# Patient Record
Sex: FEMALE | Race: WHITE | NOT HISPANIC OR LATINO | URBAN - METROPOLITAN AREA
[De-identification: names, ages, dates, MRNs, and addresses within clinical notes are randomized per-mention and may not be internally consistent; named-entity substitution may affect disease eponyms.]

---

## 2017-12-29 ENCOUNTER — EMERGENCY (EMERGENCY)
Facility: HOSPITAL | Age: 82
LOS: 1 days | Discharge: HOME | End: 2017-12-29
Attending: EMERGENCY MEDICINE | Admitting: EMERGENCY MEDICINE
Payer: COMMERCIAL

## 2017-12-29 VITALS
RESPIRATION RATE: 18 BRPM | DIASTOLIC BLOOD PRESSURE: 82 MMHG | HEART RATE: 80 BPM | SYSTOLIC BLOOD PRESSURE: 175 MMHG | OXYGEN SATURATION: 97 % | TEMPERATURE: 97 F

## 2017-12-29 VITALS
OXYGEN SATURATION: 97 % | WEIGHT: 218.92 LBS | SYSTOLIC BLOOD PRESSURE: 170 MMHG | TEMPERATURE: 98 F | HEART RATE: 80 BPM | RESPIRATION RATE: 20 BRPM | DIASTOLIC BLOOD PRESSURE: 83 MMHG | HEIGHT: 60 IN

## 2017-12-29 DIAGNOSIS — Z98.89 OTHER SPECIFIED POSTPROCEDURAL STATES: Chronic | ICD-10-CM

## 2017-12-29 LAB
ALBUMIN SERPL ELPH-MCNC: 4.3 G/DL — SIGNIFICANT CHANGE UP (ref 3.3–5.2)
ALP SERPL-CCNC: 62 U/L — SIGNIFICANT CHANGE UP (ref 40–120)
ALT FLD-CCNC: 22 U/L — SIGNIFICANT CHANGE UP
ANION GAP SERPL CALC-SCNC: 14 MMOL/L — SIGNIFICANT CHANGE UP (ref 5–17)
APTT BLD: 32.3 SEC — SIGNIFICANT CHANGE UP (ref 27.5–37.4)
AST SERPL-CCNC: 21 U/L — SIGNIFICANT CHANGE UP
BASOPHILS # BLD AUTO: 0 K/UL — SIGNIFICANT CHANGE UP (ref 0–0.2)
BASOPHILS NFR BLD AUTO: 0.3 % — SIGNIFICANT CHANGE UP (ref 0–2)
BILIRUB SERPL-MCNC: 0.3 MG/DL — LOW (ref 0.4–2)
BUN SERPL-MCNC: 25 MG/DL — HIGH (ref 8–20)
CALCIUM SERPL-MCNC: 10.1 MG/DL — SIGNIFICANT CHANGE UP (ref 8.6–10.2)
CHLORIDE SERPL-SCNC: 98 MMOL/L — SIGNIFICANT CHANGE UP (ref 98–107)
CO2 SERPL-SCNC: 27 MMOL/L — SIGNIFICANT CHANGE UP (ref 22–29)
CREAT SERPL-MCNC: 0.81 MG/DL — SIGNIFICANT CHANGE UP (ref 0.5–1.3)
EOSINOPHIL # BLD AUTO: 0.2 K/UL — SIGNIFICANT CHANGE UP (ref 0–0.5)
EOSINOPHIL NFR BLD AUTO: 1.7 % — SIGNIFICANT CHANGE UP (ref 0–6)
GLUCOSE SERPL-MCNC: 104 MG/DL — SIGNIFICANT CHANGE UP (ref 70–115)
HCT VFR BLD CALC: 38.2 % — SIGNIFICANT CHANGE UP (ref 37–47)
HGB BLD-MCNC: 12.4 G/DL — SIGNIFICANT CHANGE UP (ref 12–16)
INR BLD: 1.04 RATIO — SIGNIFICANT CHANGE UP (ref 0.88–1.16)
LYMPHOCYTES # BLD AUTO: 1.8 K/UL — SIGNIFICANT CHANGE UP (ref 1–4.8)
LYMPHOCYTES # BLD AUTO: 20.7 % — SIGNIFICANT CHANGE UP (ref 20–55)
MCHC RBC-ENTMCNC: 29.5 PG — SIGNIFICANT CHANGE UP (ref 27–31)
MCHC RBC-ENTMCNC: 32.5 G/DL — SIGNIFICANT CHANGE UP (ref 32–36)
MCV RBC AUTO: 91 FL — SIGNIFICANT CHANGE UP (ref 81–99)
MONOCYTES # BLD AUTO: 0.7 K/UL — SIGNIFICANT CHANGE UP (ref 0–0.8)
MONOCYTES NFR BLD AUTO: 7.7 % — SIGNIFICANT CHANGE UP (ref 3–10)
NEUTROPHILS # BLD AUTO: 6.2 K/UL — SIGNIFICANT CHANGE UP (ref 1.8–8)
NEUTROPHILS NFR BLD AUTO: 69.5 % — SIGNIFICANT CHANGE UP (ref 37–73)
PLATELET # BLD AUTO: 429 K/UL — HIGH (ref 150–400)
POTASSIUM SERPL-MCNC: 4.6 MMOL/L — SIGNIFICANT CHANGE UP (ref 3.5–5.3)
POTASSIUM SERPL-SCNC: 4.6 MMOL/L — SIGNIFICANT CHANGE UP (ref 3.5–5.3)
PROT SERPL-MCNC: 7.9 G/DL — SIGNIFICANT CHANGE UP (ref 6.6–8.7)
PROTHROM AB SERPL-ACNC: 11.4 SEC — SIGNIFICANT CHANGE UP (ref 9.8–12.7)
RBC # BLD: 4.2 M/UL — LOW (ref 4.4–5.2)
RBC # FLD: 12.7 % — SIGNIFICANT CHANGE UP (ref 11–15.6)
SODIUM SERPL-SCNC: 139 MMOL/L — SIGNIFICANT CHANGE UP (ref 135–145)
TROPONIN T SERPL-MCNC: <0.01 NG/ML — SIGNIFICANT CHANGE UP (ref 0–0.06)
WBC # BLD: 8.9 K/UL — SIGNIFICANT CHANGE UP (ref 4.8–10.8)
WBC # FLD AUTO: 8.9 K/UL — SIGNIFICANT CHANGE UP (ref 4.8–10.8)

## 2017-12-29 PROCEDURE — 85730 THROMBOPLASTIN TIME PARTIAL: CPT

## 2017-12-29 PROCEDURE — 96374 THER/PROPH/DIAG INJ IV PUSH: CPT

## 2017-12-29 PROCEDURE — 85610 PROTHROMBIN TIME: CPT

## 2017-12-29 PROCEDURE — 84484 ASSAY OF TROPONIN QUANT: CPT

## 2017-12-29 PROCEDURE — 93010 ELECTROCARDIOGRAM REPORT: CPT

## 2017-12-29 PROCEDURE — 93005 ELECTROCARDIOGRAM TRACING: CPT

## 2017-12-29 PROCEDURE — 99285 EMERGENCY DEPT VISIT HI MDM: CPT

## 2017-12-29 PROCEDURE — 71046 X-RAY EXAM CHEST 2 VIEWS: CPT

## 2017-12-29 PROCEDURE — 70450 CT HEAD/BRAIN W/O DYE: CPT | Mod: 26

## 2017-12-29 PROCEDURE — 99284 EMERGENCY DEPT VISIT MOD MDM: CPT

## 2017-12-29 PROCEDURE — 70450 CT HEAD/BRAIN W/O DYE: CPT

## 2017-12-29 PROCEDURE — 80053 COMPREHEN METABOLIC PANEL: CPT

## 2017-12-29 PROCEDURE — 71020: CPT | Mod: 26

## 2017-12-29 PROCEDURE — 85027 COMPLETE CBC AUTOMATED: CPT

## 2017-12-29 PROCEDURE — 36415 COLL VENOUS BLD VENIPUNCTURE: CPT

## 2017-12-29 RX ORDER — HYDRALAZINE HCL 50 MG
100 TABLET ORAL ONCE
Qty: 0 | Refills: 0 | Status: COMPLETED | OUTPATIENT
Start: 2017-12-29 | End: 2017-12-29

## 2017-12-29 RX ORDER — HYDRALAZINE HCL 50 MG
1 TABLET ORAL
Qty: 42 | Refills: 0
Start: 2017-12-29 | End: 2018-01-11

## 2017-12-29 RX ORDER — LABETALOL HCL 100 MG
10 TABLET ORAL ONCE
Qty: 0 | Refills: 0 | Status: COMPLETED | OUTPATIENT
Start: 2017-12-29 | End: 2017-12-29

## 2017-12-29 RX ADMIN — Medication 10 MILLIGRAM(S): at 16:02

## 2017-12-29 RX ADMIN — Medication 100 MILLIGRAM(S): at 19:17

## 2017-12-29 NOTE — ED STATDOCS - OBJECTIVE STATEMENT
81 y/o F pt with a hx of HDL, GERD, HTN, Afib presents to the ED with c/o generalized weakness. Pt went to see PMD this morning after checking her bp which was 230/120 around 10:00 am this morning. She took her 5 different medications for HTN this morning. Pt was also experiencing palpitations (1 episode)  this morning.  Denies CP, SOB, fevers, chills, numbness, tingling. No further complaints at this time.

## 2017-12-29 NOTE — ED ADULT NURSE NOTE - OBJECTIVE STATEMENT
pt alert and awake x3, arrived to ED with HTN crisis from PMD, PMD administered 50mg of metoprolol and 100mg of hydralazine, denies chest pain/sob, LS clear, denies n/v/d, states she feels weak.

## 2017-12-29 NOTE — ED PROVIDER NOTE - MEDICAL DECISION MAKING DETAILS
normal ct, ecg, and labs, clinically well, without symptoms in ED; OK for d/c, will add hydralazine to BP regimen

## 2017-12-29 NOTE — ED PROVIDER NOTE - OBJECTIVE STATEMENT
83 yo female hx of HTN sent in from PMD's office for hypertensive urgency; patient had a few episodes yesterday of feeling warm and flush and elevated blodd pressure measurements of 200 systolic on home BP machine; went to PMD office today c/o mild headache, ntoed  in office; given PO metoprolol and hydralazine in office and sent to ED; patient is without chest pain or SOB, headache in ED is essentially resolved.

## 2017-12-29 NOTE — ED ADULT NURSE NOTE - PMH
Cardiac rhythm disturbance    Diverticulitis    GERD (gastroesophageal reflux disease)    Hiatal hernia    HLD (hyperlipidemia)    HTN (hypertension)

## 2017-12-29 NOTE — ED STATDOCS - PROGRESS NOTE DETAILS
81 y/o F pt with a hx of HDL, GERD, HTN, Afib presents to the ED with c/o generalized weakness. Pt went to see PMD this morning after checking her bp which was 230/120 around 10:00 am this morning. She took her 5 different medications for HTN this morning. Pt was also experiencing palpitations (1 episode)  this morning.  Denies CP, SOB, fevers, chills, numbness, tingling. Pt will be sent to the Main for further eval.

## 2017-12-31 ENCOUNTER — EMERGENCY (EMERGENCY)
Facility: HOSPITAL | Age: 82
LOS: 1 days | Discharge: HOME | End: 2017-12-31
Attending: EMERGENCY MEDICINE
Payer: COMMERCIAL

## 2017-12-31 VITALS
HEART RATE: 59 BPM | OXYGEN SATURATION: 97 % | DIASTOLIC BLOOD PRESSURE: 59 MMHG | TEMPERATURE: 97 F | WEIGHT: 218.92 LBS | HEIGHT: 60 IN | RESPIRATION RATE: 18 BRPM | SYSTOLIC BLOOD PRESSURE: 129 MMHG

## 2017-12-31 VITALS
TEMPERATURE: 98 F | HEART RATE: 71 BPM | SYSTOLIC BLOOD PRESSURE: 145 MMHG | OXYGEN SATURATION: 100 % | DIASTOLIC BLOOD PRESSURE: 77 MMHG | RESPIRATION RATE: 17 BRPM

## 2017-12-31 DIAGNOSIS — Z98.89 OTHER SPECIFIED POSTPROCEDURAL STATES: Chronic | ICD-10-CM

## 2017-12-31 LAB
ALBUMIN SERPL ELPH-MCNC: 3.4 G/DL — SIGNIFICANT CHANGE UP (ref 3.3–5.2)
ALP SERPL-CCNC: 48 U/L — SIGNIFICANT CHANGE UP (ref 40–120)
ALT FLD-CCNC: 20 U/L — SIGNIFICANT CHANGE UP
ANION GAP SERPL CALC-SCNC: 13 MMOL/L — SIGNIFICANT CHANGE UP (ref 5–17)
ANION GAP SERPL CALC-SCNC: 14 MMOL/L — SIGNIFICANT CHANGE UP (ref 5–17)
APPEARANCE UR: CLEAR — SIGNIFICANT CHANGE UP
AST SERPL-CCNC: 21 U/L — SIGNIFICANT CHANGE UP
BACTERIA # UR AUTO: ABNORMAL
BASOPHILS # BLD AUTO: 0 K/UL — SIGNIFICANT CHANGE UP (ref 0–0.2)
BASOPHILS NFR BLD AUTO: 0.3 % — SIGNIFICANT CHANGE UP (ref 0–2)
BILIRUB SERPL-MCNC: 0.4 MG/DL — SIGNIFICANT CHANGE UP (ref 0.4–2)
BILIRUB UR-MCNC: NEGATIVE — SIGNIFICANT CHANGE UP
BUN SERPL-MCNC: 37 MG/DL — HIGH (ref 8–20)
BUN SERPL-MCNC: 41 MG/DL — HIGH (ref 8–20)
CALCIUM SERPL-MCNC: 9.2 MG/DL — SIGNIFICANT CHANGE UP (ref 8.6–10.2)
CALCIUM SERPL-MCNC: 9.6 MG/DL — SIGNIFICANT CHANGE UP (ref 8.6–10.2)
CHLORIDE SERPL-SCNC: 92 MMOL/L — LOW (ref 98–107)
CHLORIDE SERPL-SCNC: 99 MMOL/L — SIGNIFICANT CHANGE UP (ref 98–107)
CO2 SERPL-SCNC: 22 MMOL/L — SIGNIFICANT CHANGE UP (ref 22–29)
CO2 SERPL-SCNC: 22 MMOL/L — SIGNIFICANT CHANGE UP (ref 22–29)
COLOR SPEC: YELLOW — SIGNIFICANT CHANGE UP
CREAT SERPL-MCNC: 1.08 MG/DL — SIGNIFICANT CHANGE UP (ref 0.5–1.3)
CREAT SERPL-MCNC: 1.18 MG/DL — SIGNIFICANT CHANGE UP (ref 0.5–1.3)
DIFF PNL FLD: NEGATIVE — SIGNIFICANT CHANGE UP
EOSINOPHIL # BLD AUTO: 0.2 K/UL — SIGNIFICANT CHANGE UP (ref 0–0.5)
EOSINOPHIL NFR BLD AUTO: 3.1 % — SIGNIFICANT CHANGE UP (ref 0–6)
EPI CELLS # UR: SIGNIFICANT CHANGE UP
GLUCOSE SERPL-MCNC: 102 MG/DL — SIGNIFICANT CHANGE UP (ref 70–115)
GLUCOSE SERPL-MCNC: 113 MG/DL — SIGNIFICANT CHANGE UP (ref 70–115)
GLUCOSE UR QL: NEGATIVE MG/DL — SIGNIFICANT CHANGE UP
HCT VFR BLD CALC: 35.3 % — LOW (ref 37–47)
HGB BLD-MCNC: 11.5 G/DL — LOW (ref 12–16)
KETONES UR-MCNC: NEGATIVE — SIGNIFICANT CHANGE UP
LEUKOCYTE ESTERASE UR-ACNC: ABNORMAL
LYMPHOCYTES # BLD AUTO: 1.6 K/UL — SIGNIFICANT CHANGE UP (ref 1–4.8)
LYMPHOCYTES # BLD AUTO: 20.1 % — SIGNIFICANT CHANGE UP (ref 20–55)
MCHC RBC-ENTMCNC: 29.5 PG — SIGNIFICANT CHANGE UP (ref 27–31)
MCHC RBC-ENTMCNC: 32.6 G/DL — SIGNIFICANT CHANGE UP (ref 32–36)
MCV RBC AUTO: 90.5 FL — SIGNIFICANT CHANGE UP (ref 81–99)
MONOCYTES # BLD AUTO: 0.6 K/UL — SIGNIFICANT CHANGE UP (ref 0–0.8)
MONOCYTES NFR BLD AUTO: 8 % — SIGNIFICANT CHANGE UP (ref 3–10)
NEUTROPHILS # BLD AUTO: 5.3 K/UL — SIGNIFICANT CHANGE UP (ref 1.8–8)
NEUTROPHILS NFR BLD AUTO: 68.5 % — SIGNIFICANT CHANGE UP (ref 37–73)
NITRITE UR-MCNC: NEGATIVE — SIGNIFICANT CHANGE UP
OB PNL STL: NEGATIVE — SIGNIFICANT CHANGE UP
PH UR: 6 — SIGNIFICANT CHANGE UP (ref 5–8)
PLATELET # BLD AUTO: 383 K/UL — SIGNIFICANT CHANGE UP (ref 150–400)
POTASSIUM SERPL-MCNC: 4.4 MMOL/L — SIGNIFICANT CHANGE UP (ref 3.5–5.3)
POTASSIUM SERPL-MCNC: 4.9 MMOL/L — SIGNIFICANT CHANGE UP (ref 3.5–5.3)
POTASSIUM SERPL-SCNC: 4.4 MMOL/L — SIGNIFICANT CHANGE UP (ref 3.5–5.3)
POTASSIUM SERPL-SCNC: 4.9 MMOL/L — SIGNIFICANT CHANGE UP (ref 3.5–5.3)
PROT SERPL-MCNC: 7.2 G/DL — SIGNIFICANT CHANGE UP (ref 6.6–8.7)
PROT UR-MCNC: NEGATIVE MG/DL — SIGNIFICANT CHANGE UP
RBC # BLD: 3.9 M/UL — LOW (ref 4.4–5.2)
RBC # FLD: 12.9 % — SIGNIFICANT CHANGE UP (ref 11–15.6)
RBC CASTS # UR COMP ASSIST: SIGNIFICANT CHANGE UP /HPF (ref 0–4)
SODIUM SERPL-SCNC: 128 MMOL/L — LOW (ref 135–145)
SODIUM SERPL-SCNC: 134 MMOL/L — LOW (ref 135–145)
SP GR SPEC: 1.01 — SIGNIFICANT CHANGE UP (ref 1.01–1.02)
UROBILINOGEN FLD QL: NEGATIVE MG/DL — SIGNIFICANT CHANGE UP
WBC # BLD: 7.8 K/UL — SIGNIFICANT CHANGE UP (ref 4.8–10.8)
WBC # FLD AUTO: 7.8 K/UL — SIGNIFICANT CHANGE UP (ref 4.8–10.8)
WBC UR QL: SIGNIFICANT CHANGE UP

## 2017-12-31 PROCEDURE — 93005 ELECTROCARDIOGRAM TRACING: CPT

## 2017-12-31 PROCEDURE — 82272 OCCULT BLD FECES 1-3 TESTS: CPT

## 2017-12-31 PROCEDURE — 80048 BASIC METABOLIC PNL TOTAL CA: CPT

## 2017-12-31 PROCEDURE — 99284 EMERGENCY DEPT VISIT MOD MDM: CPT | Mod: 25

## 2017-12-31 PROCEDURE — 87086 URINE CULTURE/COLONY COUNT: CPT

## 2017-12-31 PROCEDURE — 36415 COLL VENOUS BLD VENIPUNCTURE: CPT

## 2017-12-31 PROCEDURE — 84484 ASSAY OF TROPONIN QUANT: CPT

## 2017-12-31 PROCEDURE — 99284 EMERGENCY DEPT VISIT MOD MDM: CPT

## 2017-12-31 PROCEDURE — 70450 CT HEAD/BRAIN W/O DYE: CPT

## 2017-12-31 PROCEDURE — 80053 COMPREHEN METABOLIC PANEL: CPT

## 2017-12-31 PROCEDURE — 81001 URINALYSIS AUTO W/SCOPE: CPT

## 2017-12-31 PROCEDURE — 85027 COMPLETE CBC AUTOMATED: CPT

## 2017-12-31 PROCEDURE — 70450 CT HEAD/BRAIN W/O DYE: CPT | Mod: 26

## 2017-12-31 PROCEDURE — 93010 ELECTROCARDIOGRAM REPORT: CPT

## 2017-12-31 RX ORDER — SODIUM CHLORIDE 9 MG/ML
500 INJECTION INTRAMUSCULAR; INTRAVENOUS; SUBCUTANEOUS ONCE
Qty: 0 | Refills: 0 | Status: COMPLETED | OUTPATIENT
Start: 2017-12-31 | End: 2017-12-31

## 2017-12-31 RX ORDER — SODIUM CHLORIDE 9 MG/ML
1000 INJECTION INTRAMUSCULAR; INTRAVENOUS; SUBCUTANEOUS ONCE
Qty: 0 | Refills: 0 | Status: COMPLETED | OUTPATIENT
Start: 2017-12-31 | End: 2017-12-31

## 2017-12-31 RX ADMIN — SODIUM CHLORIDE 500 MILLILITER(S): 9 INJECTION INTRAMUSCULAR; INTRAVENOUS; SUBCUTANEOUS at 14:01

## 2017-12-31 RX ADMIN — SODIUM CHLORIDE 1000 MILLILITER(S): 9 INJECTION INTRAMUSCULAR; INTRAVENOUS; SUBCUTANEOUS at 15:53

## 2017-12-31 RX ADMIN — Medication 1 TABLET(S): at 20:25

## 2017-12-31 NOTE — ED ADULT TRIAGE NOTE - CHIEF COMPLAINT QUOTE
BIBA, patient reports she has high blood pressure and recent medication change. Patient reports general malaise, fatigue, and headache.

## 2017-12-31 NOTE — ED PROVIDER NOTE - MEDICAL DECISION MAKING DETAILS
Pt here for 2nd visit w/ hypertension - now improved; Pt reports mild headache; but no CP/ SOB.  Plan to check CTh/ troponin; hydrate, reeval. Pt offered abx for L sinusitis on CT

## 2017-12-31 NOTE — ED ADULT NURSE REASSESSMENT NOTE - NS ED NURSE REASSESS COMMENT FT1
Pt to be discharged as per md spencer. Rpt Na 134, after receiving x2 boluses. Pt comfortable with DC and waiting for Paper work

## 2017-12-31 NOTE — ED ADULT NURSE NOTE - OBJECTIVE STATEMENT
Pt axox3 c/o  htn.  Pt states  her bp this morning was 200/95.  Pt recently treated and released from [facility] for htn and HCTZ was added to her medication regimen, which she states she has been complaint with. Pt states this AM she took an additional 0.2mg of clonidine, bp in Er now 118/64. Pt denies any sob or chest pain.

## 2017-12-31 NOTE — ED ADULT NURSE REASSESSMENT NOTE - COMFORT CARE
assisted to bathroom/plan of care explained/po fluids offered/meal provided/treatment delay explained/warm blanket provided

## 2017-12-31 NOTE — ED ADULT NURSE NOTE - CHPI ED SYMPTOMS NEG
no chest pain/no cough/no shortness of breath/no vomiting/no dizziness/no fever/no back pain/no diaphoresis/no nausea/no syncope/no chills

## 2017-12-31 NOTE — ED PROVIDER NOTE - OBJECTIVE STATEMENT
82F with h/o Afib on ASA, HTN, presents for evaluation of HTN after taking her BP this morning and finding it to be 200/95. Pt was here yesterday for HTN and given HCTZ  and discharged.  Pt reports she has been feeling generally fatigued recently and states that she feels some pressure behind the eyes, but denies any headache, chest pain/ SOB/ BALLESTEROS/ numbness/tingling, dizziness.  States she took an extra clonidine this morning after finding her pressure high.    Cardiologist: Dr. Pearl  PMD: Dr. Muller

## 2018-01-01 LAB
CULTURE RESULTS: NO GROWTH — SIGNIFICANT CHANGE UP
SPECIMEN SOURCE: SIGNIFICANT CHANGE UP

## 2018-01-04 ENCOUNTER — EMERGENCY (EMERGENCY)
Facility: HOSPITAL | Age: 83
LOS: 1 days | Discharge: HOME | End: 2018-01-04
Attending: EMERGENCY MEDICINE | Admitting: EMERGENCY MEDICINE
Payer: COMMERCIAL

## 2018-01-04 VITALS
OXYGEN SATURATION: 95 % | HEART RATE: 72 BPM | SYSTOLIC BLOOD PRESSURE: 165 MMHG | RESPIRATION RATE: 16 BRPM | DIASTOLIC BLOOD PRESSURE: 72 MMHG

## 2018-01-04 VITALS
HEIGHT: 60 IN | DIASTOLIC BLOOD PRESSURE: 77 MMHG | SYSTOLIC BLOOD PRESSURE: 169 MMHG | RESPIRATION RATE: 18 BRPM | HEART RATE: 82 BPM | TEMPERATURE: 98 F | OXYGEN SATURATION: 94 % | WEIGHT: 218.92 LBS

## 2018-01-04 DIAGNOSIS — Z98.89 OTHER SPECIFIED POSTPROCEDURAL STATES: Chronic | ICD-10-CM

## 2018-01-04 LAB
ALBUMIN SERPL ELPH-MCNC: 4.1 G/DL — SIGNIFICANT CHANGE UP (ref 3.3–5.2)
ALP SERPL-CCNC: 56 U/L — SIGNIFICANT CHANGE UP (ref 40–120)
ALT FLD-CCNC: 21 U/L — SIGNIFICANT CHANGE UP
ANION GAP SERPL CALC-SCNC: 13 MMOL/L — SIGNIFICANT CHANGE UP (ref 5–17)
AST SERPL-CCNC: 19 U/L — SIGNIFICANT CHANGE UP
BILIRUB SERPL-MCNC: 0.4 MG/DL — SIGNIFICANT CHANGE UP (ref 0.4–2)
BUN SERPL-MCNC: 32 MG/DL — HIGH (ref 8–20)
CALCIUM SERPL-MCNC: 10.2 MG/DL — SIGNIFICANT CHANGE UP (ref 8.6–10.2)
CHLORIDE SERPL-SCNC: 95 MMOL/L — LOW (ref 98–107)
CO2 SERPL-SCNC: 25 MMOL/L — SIGNIFICANT CHANGE UP (ref 22–29)
CREAT SERPL-MCNC: 0.87 MG/DL — SIGNIFICANT CHANGE UP (ref 0.5–1.3)
GLUCOSE SERPL-MCNC: 120 MG/DL — HIGH (ref 70–115)
HCT VFR BLD CALC: 37.9 % — SIGNIFICANT CHANGE UP (ref 37–47)
HGB BLD-MCNC: 12.7 G/DL — SIGNIFICANT CHANGE UP (ref 12–16)
MCHC RBC-ENTMCNC: 30.5 PG — SIGNIFICANT CHANGE UP (ref 27–31)
MCHC RBC-ENTMCNC: 33.5 G/DL — SIGNIFICANT CHANGE UP (ref 32–36)
MCV RBC AUTO: 90.9 FL — SIGNIFICANT CHANGE UP (ref 81–99)
NT-PROBNP SERPL-SCNC: 100 PG/ML — SIGNIFICANT CHANGE UP (ref 0–300)
PLATELET # BLD AUTO: 423 K/UL — HIGH (ref 150–400)
POTASSIUM SERPL-MCNC: 4.6 MMOL/L — SIGNIFICANT CHANGE UP (ref 3.5–5.3)
POTASSIUM SERPL-SCNC: 4.6 MMOL/L — SIGNIFICANT CHANGE UP (ref 3.5–5.3)
PROT SERPL-MCNC: 7.8 G/DL — SIGNIFICANT CHANGE UP (ref 6.6–8.7)
RBC # BLD: 4.17 M/UL — LOW (ref 4.4–5.2)
RBC # FLD: 13 % — SIGNIFICANT CHANGE UP (ref 11–15.6)
SODIUM SERPL-SCNC: 133 MMOL/L — LOW (ref 135–145)
TROPONIN T SERPL-MCNC: <0.01 NG/ML — SIGNIFICANT CHANGE UP (ref 0–0.06)
TSH SERPL-MCNC: 1.4 UIU/ML — SIGNIFICANT CHANGE UP (ref 0.27–4.2)
WBC # BLD: 7.8 K/UL — SIGNIFICANT CHANGE UP (ref 4.8–10.8)
WBC # FLD AUTO: 7.8 K/UL — SIGNIFICANT CHANGE UP (ref 4.8–10.8)

## 2018-01-04 PROCEDURE — 71045 X-RAY EXAM CHEST 1 VIEW: CPT

## 2018-01-04 PROCEDURE — 71045 X-RAY EXAM CHEST 1 VIEW: CPT | Mod: 26

## 2018-01-04 PROCEDURE — 99284 EMERGENCY DEPT VISIT MOD MDM: CPT | Mod: 25

## 2018-01-04 PROCEDURE — 99285 EMERGENCY DEPT VISIT HI MDM: CPT | Mod: 25

## 2018-01-04 PROCEDURE — 83880 ASSAY OF NATRIURETIC PEPTIDE: CPT

## 2018-01-04 PROCEDURE — 84443 ASSAY THYROID STIM HORMONE: CPT

## 2018-01-04 PROCEDURE — 84484 ASSAY OF TROPONIN QUANT: CPT

## 2018-01-04 PROCEDURE — 93010 ELECTROCARDIOGRAM REPORT: CPT

## 2018-01-04 PROCEDURE — 85027 COMPLETE CBC AUTOMATED: CPT

## 2018-01-04 PROCEDURE — 36415 COLL VENOUS BLD VENIPUNCTURE: CPT

## 2018-01-04 PROCEDURE — 93005 ELECTROCARDIOGRAM TRACING: CPT

## 2018-01-04 PROCEDURE — 80053 COMPREHEN METABOLIC PANEL: CPT

## 2018-01-04 RX ORDER — SODIUM CHLORIDE 9 MG/ML
1000 INJECTION INTRAMUSCULAR; INTRAVENOUS; SUBCUTANEOUS ONCE
Qty: 0 | Refills: 0 | Status: COMPLETED | OUTPATIENT
Start: 2018-01-04 | End: 2018-01-04

## 2018-01-04 RX ORDER — FLECAINIDE ACETATE 50 MG
50 TABLET ORAL ONCE
Qty: 0 | Refills: 0 | Status: COMPLETED | OUTPATIENT
Start: 2018-01-04 | End: 2018-01-04

## 2018-01-04 RX ORDER — NIFEDIPINE 30 MG
30 TABLET, EXTENDED RELEASE 24 HR ORAL ONCE
Qty: 0 | Refills: 0 | Status: COMPLETED | OUTPATIENT
Start: 2018-01-04 | End: 2018-01-04

## 2018-01-04 RX ORDER — METOPROLOL TARTRATE 50 MG
100 TABLET ORAL DAILY
Qty: 0 | Refills: 0 | Status: DISCONTINUED | OUTPATIENT
Start: 2018-01-04 | End: 2018-01-08

## 2018-01-04 RX ADMIN — Medication 0.2 MILLIGRAM(S): at 12:06

## 2018-01-04 RX ADMIN — Medication 30 MILLIGRAM(S): at 12:06

## 2018-01-04 RX ADMIN — SODIUM CHLORIDE 1000 MILLILITER(S): 9 INJECTION INTRAMUSCULAR; INTRAVENOUS; SUBCUTANEOUS at 10:26

## 2018-01-04 RX ADMIN — Medication 100 MILLIGRAM(S): at 12:06

## 2018-01-04 RX ADMIN — Medication 50 MILLIGRAM(S): at 12:06

## 2018-01-04 NOTE — ED ADULT NURSE REASSESSMENT NOTE - NS ED NURSE REASSESS COMMENT FT1
Patient reports she has not taken her daily BP meds and is currently hypertensive. PO medications to be given. Awaiting discharge.

## 2018-01-04 NOTE — ED PROVIDER NOTE - CARE PLAN
Principal Discharge DX:	HTN (hypertension)  Secondary Diagnosis:	Fatigue Principal Discharge DX:	HTN (hypertension)  Secondary Diagnosis:	Fatigue  Secondary Diagnosis:	Dehydration

## 2018-01-04 NOTE — ED ADULT NURSE REASSESSMENT NOTE - NS ED NURSE REASSESS COMMENT FT1
Patient to receive 2 L NS bolus for elevated BUN. No acute respiratory distress noted. Fluid bolus cleared by cardiology Yadira. discharge s/p fluids.

## 2018-01-04 NOTE — ED PROVIDER NOTE - OBJECTIVE STATEMENT
38 y/o female with a h/o asthma maintained on inhalers says she was wel until yesterday 83 y/o female with a h/o htn and afib states she has been very fatigued and she was here twice in the past few days and she was given Augmentin for sinusitis and also hydralazine 100tid was added to her bp meds and this am she got up and took her bp meds and she then took her bp a few minutes later and it was 194/97 and so she came to ed for eval no cp or sob she had a small amt of diarrhea yesterday no fever or chills and very mild nonproductive cough eating well no other symptoms 81 y/o female with a h/o htn and afib states she has been very fatigued and she was here twice in the past few days and she was given Augmentin for sinusitis and also hydralazine 100tid was added to her bp meds and this am she got up and took her bp meds and she then took her bp a few minutes later and it was 194/97 and so she came to ed for eval no cp or sob she had a small amt of diarrhea yesterday no fever or chills and very mild nonproductive cough eating well no other symptoms pt says she only drinks a cup of coffee in the morning and not much fluid during the day

## 2018-01-04 NOTE — ED PROVIDER NOTE - CHPI ED SYMPTOMS NEG
no diaphoresis/no dizziness/no chills/no nausea/no back pain/no chest pain/no syncope/no shortness of breath/no vomiting

## 2018-01-04 NOTE — ED PROVIDER NOTE - PROGRESS NOTE DETAILS
Cardiology Dr May [facility] in ed and states would rec d/c to f/u in their office and increase nifedipine to 60 mg daily no other tx until f/u in their office bun elevated as it has been the last 2 visits and this is prbably due to coffee and little else for fluid intake and contributing to pts fatigue as well as uri and diarrhea will hydrate and I discussed with pt the importance of good fluid intake as she has no h/o chf and dr Pulido says she has nl lv function pt understands and will f/u with them

## 2018-01-04 NOTE — ED ADULT NURSE NOTE - OBJECTIVE STATEMENT
Patient reports chills, general weakness. denies CP, SOB. Reports grandson was recently sick. Patient reports recent visit to ER for same issue. On Abx for sinus infection.

## 2019-12-11 ENCOUNTER — IMPORTED ENCOUNTER (OUTPATIENT)
Dept: URBAN - METROPOLITAN AREA CLINIC 50 | Facility: CLINIC | Age: 84
End: 2019-12-11

## 2020-01-16 ENCOUNTER — IMPORTED ENCOUNTER (OUTPATIENT)
Dept: URBAN - METROPOLITAN AREA CLINIC 50 | Facility: CLINIC | Age: 85
End: 2020-01-16

## 2020-08-13 ENCOUNTER — IMPORTED ENCOUNTER (OUTPATIENT)
Dept: URBAN - METROPOLITAN AREA CLINIC 50 | Facility: CLINIC | Age: 85
End: 2020-08-13

## 2021-01-28 ENCOUNTER — INPATIENT (INPATIENT)
Facility: HOSPITAL | Age: 86
LOS: 6 days | Discharge: HOME | DRG: 176 | End: 2021-02-04
Admitting: INTERNAL MEDICINE
Payer: MEDICARE

## 2021-01-28 VITALS
HEIGHT: 60 IN | RESPIRATION RATE: 18 BRPM | HEART RATE: 82 BPM | DIASTOLIC BLOOD PRESSURE: 80 MMHG | WEIGHT: 139.99 LBS | TEMPERATURE: 99 F | OXYGEN SATURATION: 94 % | SYSTOLIC BLOOD PRESSURE: 130 MMHG

## 2021-01-28 DIAGNOSIS — Z98.89 OTHER SPECIFIED POSTPROCEDURAL STATES: Chronic | ICD-10-CM

## 2021-01-28 PROCEDURE — 99285 EMERGENCY DEPT VISIT HI MDM: CPT

## 2021-01-28 NOTE — ED PROVIDER NOTE - CLINICAL SUMMARY MEDICAL DECISION MAKING FREE TEXT BOX
84 y/o F with a significant PMHx of GERD, HTN, HLD, hiatal hernia, diverticulitis, cholecystectomy, and cardiac rhythm disturbance presents to the ED c/o left sided abdominal pain onset a couple of days ago. 86 y/o F with a significant PMHx of GERD, HTN, HLD, hiatal hernia, diverticulitis, cholecystectomy, and cardiac rhythm disturbance presents to the ED c/o left sided abdominal pain onset a couple of days ago. Pt hypoxic here requiring supplemental oxygen in the setting of pna, abx started, pt given 2 grams of mag hospitalist requesting an additional 2g at this time, will admit for further management

## 2021-01-28 NOTE — ED ADULT TRIAGE NOTE - CHIEF COMPLAINT QUOTE
C/o LLQ x3 days. Pt states, "I have not had a bowel movement in a couple of days". Denies nausea, vomiting, diarrhea, cp, sob. Denies use of OTC medication for relief. Hx of HTN.

## 2021-01-28 NOTE — ED PROVIDER NOTE - OBJECTIVE STATEMENT
84 y/o F with a significant PMHx of GERD, HTN, HLD, hiatal hernia, diverticulitis, cholecystectomy, and cardiac rhythm disturbance presents to the ED c/o left sided abdominal pain onset a couple of days ago. Pt states the pain has been constant and non radiating since it started. Pt states that her last BM was a few days ago- she states that she generally has a BM daily. Pt is unsure if she has had a colonoscopy. Pt uses a walker to mobilize- pt lives with her . Pt is a nonsmoker and does not consume excessive EtOH. Pt has not taken any OTC pain medications. Pt denies any newfound rashes. NKDA. Pt denies any kidney troubles. Pt denies fevers/chills, ha, loc, focal neuro deficits, cp/sob/palp, cough, n/v/d, urinary symptoms, recent travel and sick contacts.

## 2021-01-29 ENCOUNTER — TRANSCRIPTION ENCOUNTER (OUTPATIENT)
Age: 86
End: 2021-01-29

## 2021-01-29 DIAGNOSIS — J18.9 PNEUMONIA, UNSPECIFIED ORGANISM: ICD-10-CM

## 2021-01-29 LAB
ALBUMIN SERPL ELPH-MCNC: 3.2 G/DL — LOW (ref 3.3–5.2)
ALP SERPL-CCNC: 54 U/L — SIGNIFICANT CHANGE UP (ref 40–120)
ALT FLD-CCNC: 46 U/L — HIGH
ANION GAP SERPL CALC-SCNC: 13 MMOL/L — SIGNIFICANT CHANGE UP (ref 5–17)
ANION GAP SERPL CALC-SCNC: 14 MMOL/L — SIGNIFICANT CHANGE UP (ref 5–17)
APPEARANCE UR: ABNORMAL
AST SERPL-CCNC: 37 U/L — HIGH
BASOPHILS # BLD AUTO: 0.05 K/UL — SIGNIFICANT CHANGE UP (ref 0–0.2)
BASOPHILS NFR BLD AUTO: 0.4 % — SIGNIFICANT CHANGE UP (ref 0–2)
BILIRUB SERPL-MCNC: 0.7 MG/DL — SIGNIFICANT CHANGE UP (ref 0.4–2)
BILIRUB UR-MCNC: NEGATIVE — SIGNIFICANT CHANGE UP
BUN SERPL-MCNC: 30 MG/DL — HIGH (ref 8–20)
BUN SERPL-MCNC: 32 MG/DL — HIGH (ref 8–20)
CALCIUM SERPL-MCNC: 9.3 MG/DL — SIGNIFICANT CHANGE UP (ref 8.6–10.2)
CALCIUM SERPL-MCNC: 9.7 MG/DL — SIGNIFICANT CHANGE UP (ref 8.6–10.2)
CHLORIDE SERPL-SCNC: 94 MMOL/L — LOW (ref 98–107)
CHLORIDE SERPL-SCNC: 95 MMOL/L — LOW (ref 98–107)
CO2 SERPL-SCNC: 27 MMOL/L — SIGNIFICANT CHANGE UP (ref 22–29)
CO2 SERPL-SCNC: 28 MMOL/L — SIGNIFICANT CHANGE UP (ref 22–29)
COLOR SPEC: YELLOW — SIGNIFICANT CHANGE UP
CREAT SERPL-MCNC: 1.27 MG/DL — SIGNIFICANT CHANGE UP (ref 0.5–1.3)
CREAT SERPL-MCNC: 1.28 MG/DL — SIGNIFICANT CHANGE UP (ref 0.5–1.3)
CRP SERPL-MCNC: 16.14 MG/DL — HIGH (ref 0–0.4)
D DIMER BLD IA.RAPID-MCNC: 3499 NG/ML DDU — HIGH
DIFF PNL FLD: ABNORMAL
EOSINOPHIL # BLD AUTO: 0.14 K/UL — SIGNIFICANT CHANGE UP (ref 0–0.5)
EOSINOPHIL NFR BLD AUTO: 1.1 % — SIGNIFICANT CHANGE UP (ref 0–6)
EPI CELLS # UR: ABNORMAL
FERRITIN SERPL-MCNC: 631 NG/ML — HIGH (ref 15–150)
GLUCOSE SERPL-MCNC: 120 MG/DL — HIGH (ref 70–99)
GLUCOSE SERPL-MCNC: 130 MG/DL — HIGH (ref 70–99)
GLUCOSE UR QL: NEGATIVE MG/DL — SIGNIFICANT CHANGE UP
HCT VFR BLD CALC: 33.2 % — LOW (ref 34.5–45)
HCT VFR BLD CALC: 34 % — LOW (ref 34.5–45)
HGB BLD-MCNC: 10.8 G/DL — LOW (ref 11.5–15.5)
HGB BLD-MCNC: 10.9 G/DL — LOW (ref 11.5–15.5)
IMM GRANULOCYTES NFR BLD AUTO: 0.5 % — SIGNIFICANT CHANGE UP (ref 0–1.5)
KETONES UR-MCNC: NEGATIVE — SIGNIFICANT CHANGE UP
LEUKOCYTE ESTERASE UR-ACNC: ABNORMAL
LIDOCAIN IGE QN: 17 U/L — LOW (ref 22–51)
LYMPHOCYTES # BLD AUTO: 1.47 K/UL — SIGNIFICANT CHANGE UP (ref 1–3.3)
LYMPHOCYTES # BLD AUTO: 11.8 % — LOW (ref 13–44)
MAGNESIUM SERPL-MCNC: 1.3 MG/DL — LOW (ref 1.6–2.6)
MAGNESIUM SERPL-MCNC: 2.3 MG/DL — SIGNIFICANT CHANGE UP (ref 1.6–2.6)
MCHC RBC-ENTMCNC: 29.4 PG — SIGNIFICANT CHANGE UP (ref 27–34)
MCHC RBC-ENTMCNC: 29.8 PG — SIGNIFICANT CHANGE UP (ref 27–34)
MCHC RBC-ENTMCNC: 31.8 GM/DL — LOW (ref 32–36)
MCHC RBC-ENTMCNC: 32.8 GM/DL — SIGNIFICANT CHANGE UP (ref 32–36)
MCV RBC AUTO: 90.7 FL — SIGNIFICANT CHANGE UP (ref 80–100)
MCV RBC AUTO: 92.6 FL — SIGNIFICANT CHANGE UP (ref 80–100)
MONOCYTES # BLD AUTO: 1.4 K/UL — HIGH (ref 0–0.9)
MONOCYTES NFR BLD AUTO: 11.2 % — SIGNIFICANT CHANGE UP (ref 2–14)
NEUTROPHILS # BLD AUTO: 9.35 K/UL — HIGH (ref 1.8–7.4)
NEUTROPHILS NFR BLD AUTO: 75 % — SIGNIFICANT CHANGE UP (ref 43–77)
NITRITE UR-MCNC: NEGATIVE — SIGNIFICANT CHANGE UP
OB PNL STL: NEGATIVE — SIGNIFICANT CHANGE UP
PH UR: 5 — SIGNIFICANT CHANGE UP (ref 5–8)
PHOSPHATE SERPL-MCNC: 4.2 MG/DL — SIGNIFICANT CHANGE UP (ref 2.4–4.7)
PLATELET # BLD AUTO: 187 K/UL — SIGNIFICANT CHANGE UP (ref 150–400)
PLATELET # BLD AUTO: 213 K/UL — SIGNIFICANT CHANGE UP (ref 150–400)
POTASSIUM SERPL-MCNC: 3.5 MMOL/L — SIGNIFICANT CHANGE UP (ref 3.5–5.3)
POTASSIUM SERPL-MCNC: 3.6 MMOL/L — SIGNIFICANT CHANGE UP (ref 3.5–5.3)
POTASSIUM SERPL-SCNC: 3.5 MMOL/L — SIGNIFICANT CHANGE UP (ref 3.5–5.3)
POTASSIUM SERPL-SCNC: 3.6 MMOL/L — SIGNIFICANT CHANGE UP (ref 3.5–5.3)
PROCALCITONIN SERPL-MCNC: 0.16 NG/ML — HIGH (ref 0.02–0.1)
PROT SERPL-MCNC: 7.4 G/DL — SIGNIFICANT CHANGE UP (ref 6.6–8.7)
PROT UR-MCNC: NEGATIVE MG/DL — SIGNIFICANT CHANGE UP
RAPID RVP RESULT: SIGNIFICANT CHANGE UP
RBC # BLD: 3.66 M/UL — LOW (ref 3.8–5.2)
RBC # BLD: 3.67 M/UL — LOW (ref 3.8–5.2)
RBC # FLD: 14.3 % — SIGNIFICANT CHANGE UP (ref 10.3–14.5)
RBC # FLD: 14.4 % — SIGNIFICANT CHANGE UP (ref 10.3–14.5)
RBC CASTS # UR COMP ASSIST: SIGNIFICANT CHANGE UP /HPF (ref 0–4)
SARS-COV-2 IGG SERPL QL IA: NEGATIVE — SIGNIFICANT CHANGE UP
SARS-COV-2 IGM SERPL IA-ACNC: 0.07 INDEX — SIGNIFICANT CHANGE UP
SARS-COV-2 RNA SPEC QL NAA+PROBE: SIGNIFICANT CHANGE UP
SODIUM SERPL-SCNC: 135 MMOL/L — SIGNIFICANT CHANGE UP (ref 135–145)
SODIUM SERPL-SCNC: 136 MMOL/L — SIGNIFICANT CHANGE UP (ref 135–145)
SP GR SPEC: 1.02 — SIGNIFICANT CHANGE UP (ref 1.01–1.02)
UROBILINOGEN FLD QL: NEGATIVE MG/DL — SIGNIFICANT CHANGE UP
WBC # BLD: 12.23 K/UL — HIGH (ref 3.8–10.5)
WBC # BLD: 12.47 K/UL — HIGH (ref 3.8–10.5)
WBC # FLD AUTO: 12.23 K/UL — HIGH (ref 3.8–10.5)
WBC # FLD AUTO: 12.47 K/UL — HIGH (ref 3.8–10.5)
WBC UR QL: ABNORMAL

## 2021-01-29 PROCEDURE — 71275 CT ANGIOGRAPHY CHEST: CPT | Mod: 26

## 2021-01-29 PROCEDURE — 71045 X-RAY EXAM CHEST 1 VIEW: CPT | Mod: 26

## 2021-01-29 PROCEDURE — 74176 CT ABD & PELVIS W/O CONTRAST: CPT | Mod: 26

## 2021-01-29 PROCEDURE — 99222 1ST HOSP IP/OBS MODERATE 55: CPT

## 2021-01-29 PROCEDURE — 93010 ELECTROCARDIOGRAM REPORT: CPT | Mod: 76

## 2021-01-29 PROCEDURE — 93306 TTE W/DOPPLER COMPLETE: CPT | Mod: 26

## 2021-01-29 PROCEDURE — 99223 1ST HOSP IP/OBS HIGH 75: CPT

## 2021-01-29 PROCEDURE — 93970 EXTREMITY STUDY: CPT | Mod: 26

## 2021-01-29 RX ORDER — HYDRALAZINE HCL 50 MG
50 TABLET ORAL EVERY 8 HOURS
Refills: 0 | Status: DISCONTINUED | OUTPATIENT
Start: 2021-01-29 | End: 2021-02-04

## 2021-01-29 RX ORDER — MORPHINE SULFATE 50 MG/1
2 CAPSULE, EXTENDED RELEASE ORAL EVERY 4 HOURS
Refills: 0 | Status: DISCONTINUED | OUTPATIENT
Start: 2021-01-29 | End: 2021-01-31

## 2021-01-29 RX ORDER — AZITHROMYCIN 500 MG/1
500 TABLET, FILM COATED ORAL ONCE
Refills: 0 | Status: DISCONTINUED | OUTPATIENT
Start: 2021-01-29 | End: 2021-01-29

## 2021-01-29 RX ORDER — SODIUM CHLORIDE 9 MG/ML
1000 INJECTION, SOLUTION INTRAVENOUS
Refills: 0 | Status: COMPLETED | OUTPATIENT
Start: 2021-01-29 | End: 2021-01-29

## 2021-01-29 RX ORDER — ACETAMINOPHEN 500 MG
975 TABLET ORAL ONCE
Refills: 0 | Status: COMPLETED | OUTPATIENT
Start: 2021-01-29 | End: 2021-01-29

## 2021-01-29 RX ORDER — PIPERACILLIN AND TAZOBACTAM 4; .5 G/20ML; G/20ML
3.38 INJECTION, POWDER, LYOPHILIZED, FOR SOLUTION INTRAVENOUS EVERY 8 HOURS
Refills: 0 | Status: DISCONTINUED | OUTPATIENT
Start: 2021-01-29 | End: 2021-01-29

## 2021-01-29 RX ORDER — FUROSEMIDE 40 MG
1 TABLET ORAL
Qty: 0 | Refills: 0 | DISCHARGE

## 2021-01-29 RX ORDER — SENNA PLUS 8.6 MG/1
2 TABLET ORAL AT BEDTIME
Refills: 0 | Status: DISCONTINUED | OUTPATIENT
Start: 2021-01-29 | End: 2021-02-04

## 2021-01-29 RX ORDER — LOSARTAN POTASSIUM 100 MG/1
50 TABLET, FILM COATED ORAL DAILY
Refills: 0 | Status: DISCONTINUED | OUTPATIENT
Start: 2021-01-29 | End: 2021-02-04

## 2021-01-29 RX ORDER — SIMVASTATIN 20 MG/1
20 TABLET, FILM COATED ORAL AT BEDTIME
Refills: 0 | Status: DISCONTINUED | OUTPATIENT
Start: 2021-01-29 | End: 2021-02-04

## 2021-01-29 RX ORDER — METOPROLOL TARTRATE 50 MG
1 TABLET ORAL
Qty: 0 | Refills: 0 | DISCHARGE

## 2021-01-29 RX ORDER — MAGNESIUM SULFATE 500 MG/ML
2 VIAL (ML) INJECTION ONCE
Refills: 0 | Status: COMPLETED | OUTPATIENT
Start: 2021-01-29 | End: 2021-01-29

## 2021-01-29 RX ORDER — FENOFIBRIC ACID 105 MG/1
1 TABLET ORAL
Qty: 0 | Refills: 0 | DISCHARGE

## 2021-01-29 RX ORDER — OXYBUTYNIN CHLORIDE 5 MG
5 TABLET ORAL DAILY
Refills: 0 | Status: DISCONTINUED | OUTPATIENT
Start: 2021-01-29 | End: 2021-02-04

## 2021-01-29 RX ORDER — PANTOPRAZOLE SODIUM 20 MG/1
40 TABLET, DELAYED RELEASE ORAL
Refills: 0 | Status: DISCONTINUED | OUTPATIENT
Start: 2021-01-29 | End: 2021-02-04

## 2021-01-29 RX ORDER — ENOXAPARIN SODIUM 100 MG/ML
65 INJECTION SUBCUTANEOUS EVERY 12 HOURS
Refills: 0 | Status: DISCONTINUED | OUTPATIENT
Start: 2021-01-29 | End: 2021-01-31

## 2021-01-29 RX ORDER — FLECAINIDE ACETATE 50 MG
50 TABLET ORAL EVERY 12 HOURS
Refills: 0 | Status: DISCONTINUED | OUTPATIENT
Start: 2021-01-29 | End: 2021-02-04

## 2021-01-29 RX ORDER — METOPROLOL TARTRATE 50 MG
100 TABLET ORAL DAILY
Refills: 0 | Status: DISCONTINUED | OUTPATIENT
Start: 2021-01-29 | End: 2021-02-04

## 2021-01-29 RX ORDER — FLECAINIDE ACETATE 50 MG
1 TABLET ORAL
Qty: 0 | Refills: 0 | DISCHARGE

## 2021-01-29 RX ORDER — OMEPRAZOLE 10 MG/1
1 CAPSULE, DELAYED RELEASE ORAL
Qty: 0 | Refills: 0 | DISCHARGE

## 2021-01-29 RX ORDER — POLYETHYLENE GLYCOL 3350 17 G/17G
17 POWDER, FOR SOLUTION ORAL DAILY
Refills: 0 | Status: DISCONTINUED | OUTPATIENT
Start: 2021-01-29 | End: 2021-02-04

## 2021-01-29 RX ORDER — HYDRALAZINE HCL 50 MG
50 TABLET ORAL EVERY 12 HOURS
Refills: 0 | Status: DISCONTINUED | OUTPATIENT
Start: 2021-01-29 | End: 2021-01-29

## 2021-01-29 RX ORDER — PIPERACILLIN AND TAZOBACTAM 4; .5 G/20ML; G/20ML
3.38 INJECTION, POWDER, LYOPHILIZED, FOR SOLUTION INTRAVENOUS ONCE
Refills: 0 | Status: COMPLETED | OUTPATIENT
Start: 2021-01-29 | End: 2021-01-29

## 2021-01-29 RX ORDER — OXYBUTYNIN CHLORIDE 5 MG
1 TABLET ORAL
Qty: 0 | Refills: 0 | DISCHARGE

## 2021-01-29 RX ORDER — CEFTRIAXONE 500 MG/1
1000 INJECTION, POWDER, FOR SOLUTION INTRAMUSCULAR; INTRAVENOUS ONCE
Refills: 0 | Status: DISCONTINUED | OUTPATIENT
Start: 2021-01-29 | End: 2021-01-29

## 2021-01-29 RX ORDER — SIMVASTATIN 20 MG/1
1 TABLET, FILM COATED ORAL
Qty: 0 | Refills: 0 | DISCHARGE

## 2021-01-29 RX ORDER — ENOXAPARIN SODIUM 100 MG/ML
40 INJECTION SUBCUTANEOUS DAILY
Refills: 0 | Status: DISCONTINUED | OUTPATIENT
Start: 2021-01-29 | End: 2021-01-29

## 2021-01-29 RX ORDER — FUROSEMIDE 40 MG
40 TABLET ORAL DAILY
Refills: 0 | Status: DISCONTINUED | OUTPATIENT
Start: 2021-01-29 | End: 2021-02-04

## 2021-01-29 RX ADMIN — ENOXAPARIN SODIUM 65 MILLIGRAM(S): 100 INJECTION SUBCUTANEOUS at 17:03

## 2021-01-29 RX ADMIN — PIPERACILLIN AND TAZOBACTAM 200 GRAM(S): 4; .5 INJECTION, POWDER, LYOPHILIZED, FOR SOLUTION INTRAVENOUS at 05:00

## 2021-01-29 RX ADMIN — Medication 50 GRAM(S): at 03:04

## 2021-01-29 RX ADMIN — SODIUM CHLORIDE 100 MILLILITER(S): 9 INJECTION, SOLUTION INTRAVENOUS at 21:12

## 2021-01-29 RX ADMIN — Medication 5 MILLIGRAM(S): at 13:51

## 2021-01-29 RX ADMIN — Medication 50 MILLIGRAM(S): at 06:59

## 2021-01-29 RX ADMIN — POLYETHYLENE GLYCOL 3350 17 GRAM(S): 17 POWDER, FOR SOLUTION ORAL at 13:51

## 2021-01-29 RX ADMIN — Medication 0.2 MILLIGRAM(S): at 17:04

## 2021-01-29 RX ADMIN — Medication 100 MILLIGRAM(S): at 06:59

## 2021-01-29 RX ADMIN — Medication 50 MILLIGRAM(S): at 10:46

## 2021-01-29 RX ADMIN — Medication 50 MILLIGRAM(S): at 17:04

## 2021-01-29 RX ADMIN — Medication 975 MILLIGRAM(S): at 00:39

## 2021-01-29 RX ADMIN — MORPHINE SULFATE 2 MILLIGRAM(S): 50 CAPSULE, EXTENDED RELEASE ORAL at 07:31

## 2021-01-29 RX ADMIN — PANTOPRAZOLE SODIUM 40 MILLIGRAM(S): 20 TABLET, DELAYED RELEASE ORAL at 06:59

## 2021-01-29 RX ADMIN — Medication 2 GRAM(S): at 04:03

## 2021-01-29 RX ADMIN — SIMVASTATIN 20 MILLIGRAM(S): 20 TABLET, FILM COATED ORAL at 21:12

## 2021-01-29 RX ADMIN — LOSARTAN POTASSIUM 50 MILLIGRAM(S): 100 TABLET, FILM COATED ORAL at 13:51

## 2021-01-29 RX ADMIN — SODIUM CHLORIDE 100 MILLILITER(S): 9 INJECTION, SOLUTION INTRAVENOUS at 05:20

## 2021-01-29 RX ADMIN — ENOXAPARIN SODIUM 65 MILLIGRAM(S): 100 INJECTION SUBCUTANEOUS at 07:32

## 2021-01-29 RX ADMIN — Medication 2 GRAM(S): at 05:05

## 2021-01-29 RX ADMIN — Medication 40 MILLIGRAM(S): at 06:59

## 2021-01-29 RX ADMIN — SENNA PLUS 2 TABLET(S): 8.6 TABLET ORAL at 21:12

## 2021-01-29 RX ADMIN — Medication 50 MILLIGRAM(S): at 21:12

## 2021-01-29 RX ADMIN — MORPHINE SULFATE 2 MILLIGRAM(S): 50 CAPSULE, EXTENDED RELEASE ORAL at 23:20

## 2021-01-29 RX ADMIN — Medication 50 GRAM(S): at 04:03

## 2021-01-29 RX ADMIN — Medication 0.2 MILLIGRAM(S): at 06:59

## 2021-01-29 NOTE — H&P ADULT - NSHPPHYSICALEXAM_GEN_ALL_CORE
Vital Signs Last 24 Hrs  T(C): 36.4 (29 Jan 2021 02:34), Max: 37.1 (28 Jan 2021 23:27)  T(F): 97.6 (29 Jan 2021 02:34), Max: 98.8 (28 Jan 2021 23:27)  HR: 67 (29 Jan 2021 02:34) (67 - 82)  BP: 120/74 (29 Jan 2021 02:34) (120/74 - 130/80)  BP(mean): --  RR: 20 (29 Jan 2021 02:34) (18 - 20)  SpO2: 95% (29 Jan 2021 04:03) (89% - 95%)    GENERAL: NAD, appears stated age  HEENT:  Atraumatic, Normocephalic, MMM, no oropharyngeal lesions  EYES: EOMI, PERRLA, conjunctiva and sclera clear  NECK: Supple, No JVD, no throat tenderness.  CHEST/LUNG: shallow breaths, decreased at bases otherwise clear  HEART: Regular rate and rhythm; No murmurs, rubs, or gallops  ABDOMEN: obese, ventral/umbilical hernia with some tenderness, but easily reducible  EXTREMITIES:  2+ Peripheral Pulses, No clubbing, cyanosis, or edema  PSYCH: AAOx3, normal affect  NEUROLOGY: moves all extremities spontaneously. no sensory deficits  SKIN: No rashes or lesions

## 2021-01-29 NOTE — CONSULT NOTE ADULT - SUBJECTIVE AND OBJECTIVE BOX
[facility]  INFECTIOUS DISEASES AND INTERNAL MEDICINE at [place]  =======================================================  Malik Kim MD  Diplomates American Board of Internal Medicine and Infectious Diseases  Tel  [phone]  Fax [phone]  =======================================================    [id number]  DANIEL SHAW   HPI: This 85F hx GERD, HTN, HLD, Diverticulitis presents to ER not feeling well. She states last few days hasn't been feeling well. She states she's felt nausea, weak, with some mild abdominal pain. She states hasn't had a bm in a few days. Otherwise generally not feeling well, but can't specify symptoms. Pt is poor historian. She's had some subjective low grade fevers. Denies cough, shortness of breath chest pain, chill, difficulty swallowing, dysuria or vomiting. Denies sick contacts.  In ER, pt found to be hypoxic to 89 on room air. Pulse was 132 and pt afebrile. CT abdomen/pelvis was negative for abdominal pathology, showed cavitary lesion in left lower lung. Pt given zosyn.  (29 Jan 2021 05:24)  CT lungs Angio: Bilateral pulmonary emboli. No evidence for right heart strain.  Right lower lobe cavitation likely reflects cavitary pulmonary infarct. Differential includes pulmonary abscess and cavitary neoplasm. Follow-up is recommended to ensure resolution.  Additional unenhancing left lower lobe airspace opacities may represent additional infarcts versus concomitant pneumonia.      We are called to evaluate the patient for cavitary lesions.   She has no hx of tuberculosis.   patient denies incarceration, homelessness,   denies fevers, night sweats, weight loss.   no smoking hx  her  smoked for 20+ years around her.     She was started on  anticoagulation by the medical team      I have personally reviewed the labs and data; pertinent labs and data are listed in this note; please see below.   =======================================================  Past Medical & Surgical Hx:  =====================  PAST MEDICAL & SURGICAL HISTORY:  Cardiac rhythm disturbance    Diverticulitis    Hiatal hernia    HLD (hyperlipidemia)    GERD (gastroesophageal reflux disease)    HTN (hypertension)    History of cholecystectomy      Problem List:  ==========  HEALTH ISSUES - PROBLEM Dx:        Social Hx:  =======  no toxic habits currently    FAMILY HISTORY:  No pertinent family history in first degree relatives    no significant family history of immunosuppressive disorders in mother or father   =======================================================    REVIEW OF SYSTEMS:  CONSTITUTIONAL:  No Fever or chills  HEENT:  No diplopia or blurred vision.  No earache, sore throat or runny nose.  CARDIOVASCULAR:  No pressure, squeezing, strangling, tightness, heaviness or aching about the chest, neck, axilla or epigastrium.  RESPIRATORY:  No cough, shortness of breath  GASTROINTESTINAL:  No nausea, vomiting or diarrhea.  GENITOURINARY:  No dysuria, frequency or urgency. No Blood in urine  MUSCULOSKELETAL:  no joint aches, no muscle pain  SKIN:  No change in skin, hair or nails.  NEUROLOGIC:  No Headaches, seizures or weakness.  PSYCHIATRIC:  No disorder of thought or mood.  ENDOCRINE:  No heat or cold intolerance  HEMATOLOGICAL:  No easy bruising or bleeding.    =======================================================  Allergies  sulfa drugs (Rash)    Other medications:  cloNIDine 0.2 milliGRAM(s) Oral two times a day  enoxaparin Injectable 65 milliGRAM(s) SubCutaneous every 12 hours  flecainide 50 milliGRAM(s) Oral every 12 hours  furosemide    Tablet 40 milliGRAM(s) Oral daily  hydrALAZINE 50 milliGRAM(s) Oral every 8 hours  lactated ringers. 1000 milliLiter(s) IV Continuous <Continuous>  losartan 50 milliGRAM(s) Oral daily  metoprolol succinate  milliGRAM(s) Oral daily  oxybutynin 5 milliGRAM(s) Oral daily  pantoprazole    Tablet 40 milliGRAM(s) Oral before breakfast  polyethylene glycol 3350 17 Gram(s) Oral daily  senna 2 Tablet(s) Oral at bedtime  simvastatin 20 milliGRAM(s) Oral at bedtime     piperacillin/tazobactam IVPB...   200 mL/Hr IV Intermittent (01-29-21 @ 05:00)      ======================================================  Physical Exam:  ============  T(F): 98.7 (29 Jan 2021 07:39), Max: 98.8 (28 Jan 2021 23:27)  HR: 73 (29 Jan 2021 10:45)  BP: 138/65 (29 Jan 2021 10:45)  RR: 20 (29 Jan 2021 10:45)  SpO2: 94% (29 Jan 2021 10:45) (89% - 96%)  temp max in last 48H T(F): , Max: 98.8 (01-28-21 @ 23:27)Height (cm): 152.4 (01-28-21 @ 23:27)  Weight (kg): 63.5 (01-28-21 @ 23:27)  BMI (kg/m2): 27.3 (01-28-21 @ 23:27)  BSA (m2): 1.6 (01-28-21 @ 23:27)    General:  No acute distress.  Eye: Pupils are equal, round and reactive to light, Extraocular movements are intact, Normal conjunctiva.  HENT: Normocephalic, Oral mucosa is moist, No pharyngeal erythema, No sinus tenderness.  Neck: Supple, No lymphadenopathy.  Respiratory: Lungs are clear to auscultation, Respirations are non-labored.  Cardiovascular: Normal rate, Regular rhythm,   Gastrointestinal: Soft, Non-tender, Non-distended, Normal bowel sounds.  Genitourinary: No costovertebral angle tenderness.  Lymphatics: No lymphadenopathy neck,   Musculoskeletal: Normal range of motion, Normal strength.  Integumentary: No rash.  Neurologic: Alert, Oriented, No focal deficits, Cranial Nerves II-XII are grossly intact.  Psychiatric: Appropriate mood & affect.    =======================================================  Labs:                        10.8   12.23 )-----------( 213      ( 29 Jan 2021 09:25 )             34.0     01-29    135  |  94<L>  |  30.0<H>  ----------------------------<  120<H>  3.6   |  28.0  |  1.28    Ca    9.7      29 Jan 2021 04:03  Phos  4.2     01-29  Mg     2.3     01-29    TPro  7.4  /  Alb  3.2<L>  /  TBili  0.7  /  DBili  x   /  AST  37<H>  /  ALT  46<H>  /  AlkPhos  54  01-29      Creatinine, Serum: 1.28 mg/dL (01-29-21 @ 04:03)  Creatinine, Serum: 1.27 mg/dL (01-29-21 @ 00:52)    C-Reactive Protein, Serum: 16.14 mg/dL (01-29-21 @ 04:03)      Procalcitonin, Serum: 0.16 ng/mL (01-29-21 @ 04:03)    SARS-CoV-2: NotDetec (01-29-21 @ 04:04)  Rapid RVP Result: NotDetec (01-29-21 @ 04:04)      < from: CT Angio Chest w/ IV Cont (01.29.21 @ 06:43) >     EXAM:  CT ANGIO CHEST (W)AW IC                          PROCEDURE DATE:  01/29/2021          INTERPRETATION:  CLINICAL INFORMATION: Elevated d-dimer. Abdominal CT demonstrated a cavitary lesion in the right lower lobe.    COMPARISON: CT chest 3/15/2011    PROCEDURE:  CT Angiography of the Chest.  55 ml of Visipaque 320 was injected intravenously. 45 ml were discarded.  Sagittal and coronal reformats were performed as well as 3D (MIP) reconstructions.    FINDINGS:    LUNGS AND AIRWAYS: Patent central airways.  Right lower lobe cavitation with surrounding unenhancing airspace opacity measures 3.3 x 2.0 cm. Unenhancing left lower lobe opacities. Subsegmental left lower lobe atelectasis.  PLEURA: Trace left pleural effusion.  MEDIASTINUM AND GAETANO: No lymphadenopathy.  VESSELS: Left lower lobe segmental through subsegmental pulmonary emboli. Right upper and lower lobar through subsegmental pulmonary emboli. Right middle lobar pulmonary embolus.  HEART: Heart size is normal. No pericardial effusion.  CHEST WALL AND LOWER NECK: Within normal limits.  VISUALIZED UPPER ABDOMEN: Within normal limits.  BONES: Within normal limits.    IMPRESSION:  Bilateral pulmonary emboli. No evidence for right heart strain.  Right lower lobe cavitation likely reflects cavitary pulmonary infarct. Differential includes pulmonary abscess and cavitary neoplasm. Follow-up is recommended to ensure resolution.  Additional unenhancing left lower lobe airspace opacities may represent additional infarcts versus concomitant pneumonia.  Findings were discussed with Dr. SYDNEE PANTOJA [phone] 1/29/2021 6:53 AM by Dr. Sena with read back confirmation.            TANNA SENA MD; Attending Radiologist  This document has been electronically signed. Jan29 2021  6:57AM    < end of copied text >  
PULMONARY CONSULT NOTE      CHAYO [id number]    Patient is a 85y old  Female who presents with a chief complaint of hypoxia, cavitary lung lesion (29 Jan 2021 05:24)      HISTORY OF PRESENT ILLNESS: Pt poor historian; hx dementia; hx from chart and daughter Isabel over phone. 85F hx GERD, HTN, HLD, Diverticulitis presents to ER not feeling well. She states last few days hasn't been feeling well. She states she's felt nausea, weak, with some mild abdominal pain. She states hasn't had a bm in a few days. Otherwise generally not feeling well, but can't specify symptoms. Pt is poor historian. She's had some subjective low grade fevers. Denies cough, shortness of breath chest pain, chill, difficulty swallowing, dysuria or vomiting. Denies sick contacts. Daughter says the patient's  called 911 b/c of her back pain, poor appetite the past few days.     In ER, pt found to be hypoxic to 89 on room air. Pulse was 132 and pt afebrile. CT abdomen/pelvis was negative for abdominal pathology, showed cavitary lesion in left lower lung. Pt given zosyn.     CTA done showed b/l PE.      MEDICATIONS  (STANDING):  cloNIDine 0.2 milliGRAM(s) Oral two times a day  enoxaparin Injectable 65 milliGRAM(s) SubCutaneous every 12 hours  flecainide 50 milliGRAM(s) Oral every 12 hours  furosemide    Tablet 40 milliGRAM(s) Oral daily  hydrALAZINE 50 milliGRAM(s) Oral every 8 hours  lactated ringers. 1000 milliLiter(s) (100 mL/Hr) IV Continuous <Continuous>  losartan 50 milliGRAM(s) Oral daily  metoprolol succinate  milliGRAM(s) Oral daily  oxybutynin 5 milliGRAM(s) Oral daily  pantoprazole    Tablet 40 milliGRAM(s) Oral before breakfast  polyethylene glycol 3350 17 Gram(s) Oral daily  senna 2 Tablet(s) Oral at bedtime  simvastatin 20 milliGRAM(s) Oral at bedtime      MEDICATIONS  (PRN):  morphine  - Injectable 2 milliGRAM(s) IV Push every 4 hours PRN Severe Pain (7 - 10)      Allergies    sulfa drugs (Rash)    Intolerances        PAST MEDICAL & SURGICAL HISTORY:  Cardiac rhythm disturbance    Diverticulitis    Hiatal hernia    HLD (hyperlipidemia)    GERD (gastroesophageal reflux disease)    HTN (hypertension)    History of cholecystectomy        FAMILY HISTORY:  No pertinent family history in first degree relatives        SOCIAL HISTORY  Smoking History: denies ever smoking    REVIEW OF SYSTEMS:    unable to get ROS    Vital Signs Last 24 Hrs  T(C): 37.1 (29 Jan 2021 07:39), Max: 37.1 (28 Jan 2021 23:27)  T(F): 98.7 (29 Jan 2021 07:39), Max: 98.8 (28 Jan 2021 23:27)  HR: 73 (29 Jan 2021 10:45) (67 - 82)  BP: 138/65 (29 Jan 2021 10:45) (120/74 - 189/83)  BP(mean): --  RR: 20 (29 Jan 2021 10:45) (18 - 24)  SpO2: 94% (29 Jan 2021 10:45) (89% - 96%)    PHYSICAL EXAMINATION:    GENERAL: The patient is  in no apparent distress.     HEENT: Head is normocephalic and atraumatic.  Mucous membranes are moist.     NECK: Supple.     LUNGS: rales b/l bases, respirations unlabored    HEART: Regular rate and rhythm     ABDOMEN: Soft, nontender, and nondistended.       EXTREMITIES: Without any cyanosis, clubbing, rash, lesions or edema.    NEUROLOGIC: Grossly intact.      LABS:                        10.8   12.23 )-----------( 213      ( 29 Jan 2021 09:25 )             34.0     01-29    135  |  94<L>  |  30.0<H>  ----------------------------<  120<H>  3.6   |  28.0  |  1.28    Ca    9.7      29 Jan 2021 04:03  Phos  4.2     01-29  Mg     2.3     01-29    TPro  7.4  /  Alb  3.2<L>  /  TBili  0.7  /  DBili  x   /  AST  37<H>  /  ALT  46<H>  /  AlkPhos  54  01-29             D-Dimer Assay, Quantitative: 3499 ng/mL DDU (01-29-21 @ 04:03)        Procalcitonin, Serum: 0.16 ng/mL (01-29-21 @ 04:03)      MICROBIOLOGY:  SARS-CoV-2: [facility]: This Respiratory Panel uses polymerase chain reaction (PCR) to detect for  adenovirus; coronavirus (HKU1, NL63, 229E, OC43); human metapneumovirus  (hMPV); human enterovirus/rhinovirus (Entero/RV); influenza A; influenza  A/H1; influenza A/H3; influenza A/H1-2009; influenza B; parainfluenza  viruses 1, 2, 3, 4; respiratory syncytial virus; Mycoplasma pneumoniae;  Chlamydophila pneumoniae; and SARS-CoV-2. (01.29.21 @ 04:04)        RADIOLOGY & ADDITIONAL STUDIES:  < from: CT Angio Chest w/ IV Cont (01.29.21 @ 06:43) >     EXAM:  CT ANGIO CHEST (W)AW IC                          PROCEDURE DATE:  01/29/2021          INTERPRETATION:  CLINICAL INFORMATION: Elevated d-dimer. Abdominal CT demonstrated a cavitary lesion in the right lower lobe.    COMPARISON: CT chest 3/15/2011    PROCEDURE:  CT Angiography of the Chest.  55 ml of Visipaque 320 was injected intravenously. 45 ml were discarded.  Sagittal and coronal reformats were performed as well as 3D (MIP) reconstructions.    FINDINGS:    LUNGS AND AIRWAYS: Patent central airways.  Right lower lobe cavitation with surrounding unenhancing airspace opacity measures 3.3 x 2.0 cm. Unenhancing left lower lobe opacities. Subsegmental left lower lobe atelectasis.  PLEURA: Trace left pleural effusion.  MEDIASTINUM AND GAETANO: No lymphadenopathy.  VESSELS: Left lower lobe segmental through subsegmental pulmonary emboli. Right upper and lower lobar through subsegmental pulmonary emboli. Right middle lobar pulmonary embolus.  HEART: Heart size is normal. No pericardial effusion.  CHEST WALL AND LOWER NECK: Within normal limits.  VISUALIZED UPPER ABDOMEN: Within normal limits.  BONES: Within normal limits.    IMPRESSION:  Bilateral pulmonary emboli. No evidence for right heart strain.    Right lower lobe cavitation likely reflects cavitary pulmonary infarct. Differential includes pulmonary abscess and cavitary neoplasm. Follow-up is recommended to ensure resolution.    Additional unenhancing left lower lobe airspace opacities may represent additional infarcts versus concomitant pneumonia.    Findings were discussed with Dr. SYDNEE PANTOJA [phone] 1/29/2021 6:53 AM by Dr. Sena with read back confirmation.            TANNA SENA MD; Attending Radiologist  This document has been electronically signed. Jan29 2021  6:57AM    < end of copied text >

## 2021-01-29 NOTE — DISCHARGE NOTE NURSING/CASE MANAGEMENT/SOCIAL WORK - NSDCFUADDAPPT_GEN_ALL_CORE_FT
STAR patient : Patient has a prescheduled appointment with Dr Kendell Aponte on Monday 2/8/ 2021 at 11:20 am  STAR patient : Patient has a prescheduled appointment with Dr Kendell Aponte on Monday 2/8/ 2021 at 11:20 am     PULM - Pt. has an appointment on  2/11 at 12:15 with Dr Yanelis Mayo, Address: [address] [place] [address] [address], [place] [address]  Phone: [phone]    Daughter will manage her medications . STAR patient : Patient has a prescheduled appointment with Dr Kendell Aponte on Monday 2/8/ 2021 at 11:20 am     PULM - Pt. has an appointment on  2/11 at 12:15 with Dr Yanelis Mayo, Address: [address] [place] [address] [address], [place] [address]  Phone: [phone]     STAR patient : Patient has a prescheduled appointment with Dr Kendell Aponte on Monday 2/8/ 2021 at 11:20 am     PULM - Pt. has an appointment on  2/11 at 12:15 with Dr Yanelis Mayo, Address: [address] [place] [address] [address], [place] [address]  Phone: [phone]    Daughter informed she will manage pt's medications ,no concerns reported.

## 2021-01-29 NOTE — CONSULT NOTE ADULT - ASSESSMENT
This 85F hx GERD, HTN, HLD, Diverticulitis presents to ER not feeling well. She states last few days hasn't been feeling well. She states she's felt nausea, weak, with some mild abdominal pain. She states hasn't had a bm in a few days. Otherwise generally not feeling well, but can't specify symptoms. Pt is poor historian. She's had some subjective low grade fevers. Denies cough, shortness of breath chest pain, chill, difficulty swallowing, dysuria or vomiting. Denies sick contacts.  In ER, pt found to be hypoxic to 89 on room air. Pulse was 132 and pt afebrile. CT abdomen/pelvis was negative for abdominal pathology, showed cavitary lesion in left lower lung. Pt given zosyn.  (29 Jan 2021 05:24)  CT lungs Angio: Bilateral pulmonary emboli. No evidence for right heart strain.  Right lower lobe cavitation likely reflects cavitary pulmonary infarct. Differential includes pulmonary abscess and cavitary neoplasm. Follow-up is recommended to ensure resolution.  Additional unenhancing left lower lobe airspace opacities may represent additional infarcts versus concomitant pneumonia.      We are called to evaluate the patient for cavitary lesions.   She has no hx of tuberculosis.   patient denies incarceration, homelessness,   denies fevers, night sweats, weight loss.   no smoking hx  her  smoked for 20+ years around her.     Impression:  - Bilateral PE  - cavitary lung lesion  - hypoxia      Plan:  - for cavitary lung lesion  - no clear sx of tuberculosis  - significant hx of second hand smoking, ? lung neoplasm with necrosis  - check TB quantiferon  - no need for resp isolation    Bilateral PE/ Hypoxia  - continue oxygen and Anticoagulation per Medical team      No further specific infectious disease recommendations. Will be available as needed.    Will sign off.

## 2021-01-29 NOTE — DISCHARGE NOTE NURSING/CASE MANAGEMENT/SOCIAL WORK - PATIENT PORTAL LINK FT
You can access the FollowMyHealth Patient Portal offered by [facility] by registering at the following website: [email or web address]. By joining [facility]’s FollowMyHealth portal, you will also be able to view your health information using other applications (apps) compatible with our system.

## 2021-01-29 NOTE — ED ADULT NURSE NOTE - OBJECTIVE STATEMENT
Pt presents to ED c/o abdominal pain xtoday. Pt denies N/V/D and states it has been "a few days" since she had a BM. Pt states that she usually goes every day. Pt denies other complaints, is AAOx4 and in NAD.

## 2021-01-29 NOTE — CONSULT NOTE ADULT - ASSESSMENT
-PE  -Cavitary lung lesion; infectious etiology possible, no fever, no cough but mild WBC; could also be from infarct; malignancy  in DDx as well  -Leukocytosis    RECC:  Anticoagulation. ECHO. Would trt for pna; ID consult. She says she has no cough or sputum but if she does would do sputum culture.     D/W dtr Isabel.

## 2021-01-29 NOTE — CHART NOTE - NSCHARTNOTEFT_GEN_A_CORE
Received call from Radiologist regarding CTA. Patient found to have bilateral PE. No evidence of right heart strain. Initial cavitary lung lesion possibly just infarcted lung. Will start patient on full dose lovenox. Check TTE, LE dopplers, probnp and troponin. Patient made aware of diagnosis

## 2021-01-29 NOTE — H&P ADULT - ASSESSMENT
85F hx GERD, HTN, HLD, Diverticulitis presents to ER not feeling well/nonspecific symptoms found to be hypoxic with cavitary lung lesion.       #Hypoxia  -likely 2/2 pneumonia  -d-dimer greatly elevated, will rule out PE with CTA  -rule out covid, rvp sent  -currently saturating well on 2L nc  -continue to monitor on     #Pneumonia w/ cavitary lung lesion  -will further assess with CTA chest  -denies aspiration, recent travel, prior lung infections  -will treat with zosyn to cover anaerobes/atypicals  -pulm consult  -id consult    #HTN  -will c/w toprol, clonidine, and hydralazine with old parameters    #HLD  -c/w simvastatin    #Hypomagnesemia  -1.4, repleted in ER  -continue to monitor    #cardiac arrythmia  -pt on flecainide at home, chart states history of cardiac arrythmia  -patient states she takes flecainide, but doesn't recall diagnosis of afib or other cardiac arrythmia  -will continue for now, clarify on am    #DVT  ppx- lovenox 40 sq 85F hx GERD, HTN, HLD, Diverticulitis presents to ER not feeling well/nonspecific symptoms found to be hypoxic with cavitary lung lesion.       #Hypoxia  -likely 2/2 pneumonia  -d-dimer greatly elevated, will rule out PE with CTA  -rule out covid, rvp sent  -currently saturating well on 2L nc  -continue to monitor on     #Pneumonia w/ cavitary lung lesion  -will further assess with CTA chest  -denies aspiration, recent travel, prior lung infections  -will treat with zosyn to cover anaerobes/atypicals  -pulm consult  -id consult    #HTN  -will c/w toprol, clonidine, and hydralazine with old parameters    #HLD  -c/w simvastatin    #Constipation  -will start on bowel regimen  -monitor for bm's    #umbilical hernia  -slightly tender, but reducible  -continue to monitor with serial abdominal exams    #Hypomagnesemia  -1.4, repleted in ER  -continue to monitor    #cardiac arrythmia  -pt on flecainide at home, chart states history of cardiac arrythmia  -patient states she takes flecainide, but doesn't recall diagnosis of afib or other cardiac arrythmia  -will continue for now, clarify on am    #DVT  ppx- lovenox 40 sq

## 2021-01-29 NOTE — H&P ADULT - HISTORY OF PRESENT ILLNESS
85F hx GERD, HTN, HLD, Diverticulitis presents to ER not feeling well. She states last few days hasn't been feeling well. She states she's felt nausea, weak, with some mild abdominal pain. She states hasn't had a bm in a few days. Otherwise generally not feeling well, but can't specify symptoms. Pt is poor historian. She's had some subjective low grade fevers. Denies cough, shortness of breath chest pain, chill, difficulty swallowing, dysuria or vomiting. Denies sick contacts.    In ER, pt found to be hypoxic to 89 on room air. Pulse was 132 and pt afebrile. CT abdomen/pelvis was negative for abdominal pathology, showed cavitary lesion in left lower lung. Pt given zosyn.

## 2021-01-29 NOTE — H&P ADULT - NSICDXPASTMEDICALHX_GEN_ALL_CORE_FT
PAST MEDICAL HISTORY:  Cardiac rhythm disturbance     Diverticulitis     GERD (gastroesophageal reflux disease)     Hiatal hernia     HLD (hyperlipidemia)     HTN (hypertension)

## 2021-01-29 NOTE — H&P ADULT - NSHPLABSRESULTS_GEN_ALL_CORE
10.9   12.47 )-----------( 187      ( 29 Jan 2021 00:52 )             33.2   01-29    136  |  95<L>  |  32.0<H>  ----------------------------<  130<H>  3.5   |  27.0  |  1.27    Ca    9.3      29 Jan 2021 00:52  Mg     1.3     01-29    TPro  7.4  /  Alb  3.2<L>  /  TBili  0.7  /  DBili  x   /  AST  37<H>  /  ALT  46<H>  /  AlkPhos  54  01-29        Lactate Trend    CAPILLARY BLOOD GLUCOSE    RADIOLOGY, EKG & ADDITIONAL TESTS: Reviewed.     < from: CT Renal Stone Hunt (01.29.21 @ 02:04) >    FINDINGS:  LOWER CHEST: Peribronchial thickening adjacent infiltrate in the basilar segments of the left lower lobe. 4 x 2.5 x 4.6 cm thick walled, cavitary mass in the superior segment right lower lobe, inseparable from the major fissure, with adjacent pleural thickening. This is new from the prior. Coronary artery calcifications. Mitral valve calcifications. Trace left pleural effusion.    LIVER: Hepatic steatosis and mild hepatomegaly  BILE DUCTS: Unremarkable, unenhanced appearance  GALLBLADDER: Cholecystectomy.  SPLEEN: Unremarkable, unenhanced appearance  PANCREAS: Fatty infiltration  ADRENALS: 2.3 cm adenoma in the left adrenal gland  KIDNEYS/URETERS: Bilateral renal cysts, as well as other hyperdense lesions that are incompletely characterized by noncontrast CT. No hydronephrosis or hydroureter.    BLADDER: Unremarkable, unenhanced appearance  REPRODUCTIVE ORGANS: Leiomyomatous uterus.    BOWEL: No bowel obstruction. Appendix is normal. Diverticulosis coli.  PERITONEUM: No free airor free fluid  VESSELS: Normal caliber  RETROPERITONEUM/LYMPH NODES: No hemorrhage or enlarged lymph node measuring greater than 10 mm in short axis  ABDOMINAL WALL: Small bowel containing ventral hernia without obstruction.  BONES: Degenerative changes without acute osseous abnormality.    IMPRESSION:    1. Left lower lobe bronchopneumonia with small left pleural effusion.  2. Thick walled cavitary mass in the superior segment right lower lobe with adjacent pleural thickening. A CT chest with contrast is recommended to evaluate for malignancy.  3. Bilateral renal cysts as well as other hyperdense lesions are incompletely characterized on this noncontrast CT. Nonemergent/outpatient sonographic follow-up can be obtained if clinically warranted.    < end of copied text >

## 2021-01-30 DIAGNOSIS — I10 ESSENTIAL (PRIMARY) HYPERTENSION: ICD-10-CM

## 2021-01-30 DIAGNOSIS — E78.5 HYPERLIPIDEMIA, UNSPECIFIED: ICD-10-CM

## 2021-01-30 DIAGNOSIS — I26.99 OTHER PULMONARY EMBOLISM WITHOUT ACUTE COR PULMONALE: ICD-10-CM

## 2021-01-30 DIAGNOSIS — K21.9 GASTRO-ESOPHAGEAL REFLUX DISEASE WITHOUT ESOPHAGITIS: ICD-10-CM

## 2021-01-30 DIAGNOSIS — I49.9 CARDIAC ARRHYTHMIA, UNSPECIFIED: ICD-10-CM

## 2021-01-30 LAB
ANION GAP SERPL CALC-SCNC: 14 MMOL/L — SIGNIFICANT CHANGE UP (ref 5–17)
BUN SERPL-MCNC: 23 MG/DL — HIGH (ref 8–20)
CALCIUM SERPL-MCNC: 9.5 MG/DL — SIGNIFICANT CHANGE UP (ref 8.6–10.2)
CHLORIDE SERPL-SCNC: 95 MMOL/L — LOW (ref 98–107)
CO2 SERPL-SCNC: 29 MMOL/L — SIGNIFICANT CHANGE UP (ref 22–29)
CREAT SERPL-MCNC: 1.1 MG/DL — SIGNIFICANT CHANGE UP (ref 0.5–1.3)
GAMMA INTERFERON BACKGROUND BLD IA-ACNC: 0.03 IU/ML — SIGNIFICANT CHANGE UP
GLUCOSE SERPL-MCNC: 117 MG/DL — HIGH (ref 70–99)
HCT VFR BLD CALC: 34.4 % — LOW (ref 34.5–45)
HGB BLD-MCNC: 11 G/DL — LOW (ref 11.5–15.5)
LEGIONELLA AG UR QL: NEGATIVE — SIGNIFICANT CHANGE UP
M TB IFN-G BLD-IMP: NEGATIVE — SIGNIFICANT CHANGE UP
M TB IFN-G CD4+ BCKGRND COR BLD-ACNC: -0.01 IU/ML — SIGNIFICANT CHANGE UP
M TB IFN-G CD4+CD8+ BCKGRND COR BLD-ACNC: -0.01 IU/ML — SIGNIFICANT CHANGE UP
MCHC RBC-ENTMCNC: 29.6 PG — SIGNIFICANT CHANGE UP (ref 27–34)
MCHC RBC-ENTMCNC: 32 GM/DL — SIGNIFICANT CHANGE UP (ref 32–36)
MCV RBC AUTO: 92.5 FL — SIGNIFICANT CHANGE UP (ref 80–100)
PLATELET # BLD AUTO: 250 K/UL — SIGNIFICANT CHANGE UP (ref 150–400)
POTASSIUM SERPL-MCNC: 3.6 MMOL/L — SIGNIFICANT CHANGE UP (ref 3.5–5.3)
POTASSIUM SERPL-SCNC: 3.6 MMOL/L — SIGNIFICANT CHANGE UP (ref 3.5–5.3)
QUANT TB PLUS MITOGEN MINUS NIL: 3.53 IU/ML — SIGNIFICANT CHANGE UP
RBC # BLD: 3.72 M/UL — LOW (ref 3.8–5.2)
RBC # FLD: 14.1 % — SIGNIFICANT CHANGE UP (ref 10.3–14.5)
SODIUM SERPL-SCNC: 138 MMOL/L — SIGNIFICANT CHANGE UP (ref 135–145)
WBC # BLD: 13.01 K/UL — HIGH (ref 3.8–10.5)
WBC # FLD AUTO: 13.01 K/UL — HIGH (ref 3.8–10.5)

## 2021-01-30 PROCEDURE — 99232 SBSQ HOSP IP/OBS MODERATE 35: CPT

## 2021-01-30 PROCEDURE — 99233 SBSQ HOSP IP/OBS HIGH 50: CPT

## 2021-01-30 RX ADMIN — Medication 100 MILLIGRAM(S): at 05:22

## 2021-01-30 RX ADMIN — PANTOPRAZOLE SODIUM 40 MILLIGRAM(S): 20 TABLET, DELAYED RELEASE ORAL at 05:26

## 2021-01-30 RX ADMIN — Medication 50 MILLIGRAM(S): at 21:05

## 2021-01-30 RX ADMIN — SENNA PLUS 2 TABLET(S): 8.6 TABLET ORAL at 21:05

## 2021-01-30 RX ADMIN — ENOXAPARIN SODIUM 65 MILLIGRAM(S): 100 INJECTION SUBCUTANEOUS at 18:12

## 2021-01-30 RX ADMIN — ENOXAPARIN SODIUM 65 MILLIGRAM(S): 100 INJECTION SUBCUTANEOUS at 05:23

## 2021-01-30 RX ADMIN — Medication 0.2 MILLIGRAM(S): at 05:22

## 2021-01-30 RX ADMIN — Medication 5 MILLIGRAM(S): at 15:09

## 2021-01-30 RX ADMIN — Medication 40 MILLIGRAM(S): at 05:23

## 2021-01-30 RX ADMIN — Medication 50 MILLIGRAM(S): at 05:23

## 2021-01-30 RX ADMIN — Medication 50 MILLIGRAM(S): at 05:22

## 2021-01-30 RX ADMIN — LOSARTAN POTASSIUM 50 MILLIGRAM(S): 100 TABLET, FILM COATED ORAL at 05:23

## 2021-01-30 RX ADMIN — Medication 50 MILLIGRAM(S): at 18:12

## 2021-01-30 RX ADMIN — SIMVASTATIN 20 MILLIGRAM(S): 20 TABLET, FILM COATED ORAL at 21:05

## 2021-01-30 RX ADMIN — Medication 0.2 MILLIGRAM(S): at 18:12

## 2021-01-30 RX ADMIN — Medication 50 MILLIGRAM(S): at 12:01

## 2021-01-30 NOTE — PROGRESS NOTE ADULT - ASSESSMENT
-PE  -Cavitary lung lesion; infectious etiology possible, no fever, no cough but mild WBC; could also be from infarct; malignancy  in DDx as well  -Leukocytosis  -Dementia    RECC:  Anticoagulation.   Abx reccs per ID.  She says she has no cough or sputum but if she does would do sputum culture.       -PE  -Cavitary lung lesion; infectious etiology possible, no fever, no cough but mild WBC; could also be from infarct; malignancy  in DDx as well  -Leukocytosis  -Dementia    RECC:  Anticoagulation.   Abx reccs per ID.  She says she has no cough or sputum but if she does would do sputum culture. Repeat CT as outpt 2-3 months.

## 2021-01-30 NOTE — PROGRESS NOTE ADULT - PROBLEM SELECTOR PLAN 1
Also has bilateral DVT. Continue Lovenox, supplemental oxygen, will transition to oral AC. Monitor pulse ox.

## 2021-01-30 NOTE — PROGRESS NOTE ADULT - ATTENDING COMMENTS
Discussed with patient and RN. Fall at home per patient, will consult PT. Discussed with patient and RN. Fall at home per patient, will consult PT.  Updated daughter Isabel, [phone].

## 2021-01-30 NOTE — PROGRESS NOTE ADULT - ASSESSMENT
85 yr old female with hypertension, hyperlipidemia, dementia, GERD, diverticulitis presented with few days of not feeling well, cough, low grade fevers. She was noted to be hypoxic and tachycardic in ED. CT abdomen was done, did not reveal any abdominal pathology, showed a right lower lung cavitary lesion. CTA chest was done, showed bilateral pulmonary emboli with possible pulmonary infarct, no right heart strain. Full dose anticoagulation was initiated. ECHO and lower extremity dopplers were ordered. Pulmonary and ID consultations were requested. ID advised checking QuantiFeron for TB but no need for airborne isolation.

## 2021-01-31 DIAGNOSIS — R91.8 OTHER NONSPECIFIC ABNORMAL FINDING OF LUNG FIELD: ICD-10-CM

## 2021-01-31 LAB
ANION GAP SERPL CALC-SCNC: 10 MMOL/L — SIGNIFICANT CHANGE UP (ref 5–17)
BUN SERPL-MCNC: 24 MG/DL — HIGH (ref 8–20)
CALCIUM SERPL-MCNC: 9.7 MG/DL — SIGNIFICANT CHANGE UP (ref 8.6–10.2)
CHLORIDE SERPL-SCNC: 94 MMOL/L — LOW (ref 98–107)
CO2 SERPL-SCNC: 30 MMOL/L — HIGH (ref 22–29)
CREAT SERPL-MCNC: 0.96 MG/DL — SIGNIFICANT CHANGE UP (ref 0.5–1.3)
GLUCOSE SERPL-MCNC: 129 MG/DL — HIGH (ref 70–99)
HCT VFR BLD CALC: 33.1 % — LOW (ref 34.5–45)
HGB BLD-MCNC: 10.5 G/DL — LOW (ref 11.5–15.5)
MCHC RBC-ENTMCNC: 29.1 PG — SIGNIFICANT CHANGE UP (ref 27–34)
MCHC RBC-ENTMCNC: 31.7 GM/DL — LOW (ref 32–36)
MCV RBC AUTO: 91.7 FL — SIGNIFICANT CHANGE UP (ref 80–100)
PLATELET # BLD AUTO: 278 K/UL — SIGNIFICANT CHANGE UP (ref 150–400)
POTASSIUM SERPL-MCNC: 3.5 MMOL/L — SIGNIFICANT CHANGE UP (ref 3.5–5.3)
POTASSIUM SERPL-SCNC: 3.5 MMOL/L — SIGNIFICANT CHANGE UP (ref 3.5–5.3)
RBC # BLD: 3.61 M/UL — LOW (ref 3.8–5.2)
RBC # FLD: 13.8 % — SIGNIFICANT CHANGE UP (ref 10.3–14.5)
SODIUM SERPL-SCNC: 134 MMOL/L — LOW (ref 135–145)
WBC # BLD: 12.75 K/UL — HIGH (ref 3.8–10.5)
WBC # FLD AUTO: 12.75 K/UL — HIGH (ref 3.8–10.5)

## 2021-01-31 PROCEDURE — 99233 SBSQ HOSP IP/OBS HIGH 50: CPT

## 2021-01-31 RX ORDER — APIXABAN 2.5 MG/1
10 TABLET, FILM COATED ORAL EVERY 12 HOURS
Refills: 0 | Status: DISCONTINUED | OUTPATIENT
Start: 2021-01-31 | End: 2021-02-04

## 2021-01-31 RX ADMIN — APIXABAN 10 MILLIGRAM(S): 2.5 TABLET, FILM COATED ORAL at 16:48

## 2021-01-31 RX ADMIN — Medication 100 MILLIGRAM(S): at 05:29

## 2021-01-31 RX ADMIN — Medication 50 MILLIGRAM(S): at 16:48

## 2021-01-31 RX ADMIN — Medication 50 MILLIGRAM(S): at 14:19

## 2021-01-31 RX ADMIN — Medication 50 MILLIGRAM(S): at 21:49

## 2021-01-31 RX ADMIN — SIMVASTATIN 20 MILLIGRAM(S): 20 TABLET, FILM COATED ORAL at 21:49

## 2021-01-31 RX ADMIN — ENOXAPARIN SODIUM 65 MILLIGRAM(S): 100 INJECTION SUBCUTANEOUS at 05:32

## 2021-01-31 RX ADMIN — Medication 40 MILLIGRAM(S): at 05:29

## 2021-01-31 RX ADMIN — PANTOPRAZOLE SODIUM 40 MILLIGRAM(S): 20 TABLET, DELAYED RELEASE ORAL at 05:30

## 2021-01-31 RX ADMIN — Medication 0.2 MILLIGRAM(S): at 16:48

## 2021-01-31 RX ADMIN — Medication 50 MILLIGRAM(S): at 05:30

## 2021-01-31 RX ADMIN — Medication 5 MILLIGRAM(S): at 08:30

## 2021-01-31 RX ADMIN — LOSARTAN POTASSIUM 50 MILLIGRAM(S): 100 TABLET, FILM COATED ORAL at 05:29

## 2021-01-31 RX ADMIN — Medication 0.2 MILLIGRAM(S): at 05:31

## 2021-01-31 NOTE — PROGRESS NOTE ADULT - ATTENDING COMMENTS
Discussed with patient and RN.  Anticipate discharge in 24-48 hrs. Discussed with patient and RN.  Anticipate discharge in 24-48 hrs.  Updated daughter Isabel.

## 2021-01-31 NOTE — PROGRESS NOTE ADULT - ASSESSMENT
85 yr old female with hypertension, hyperlipidemia, dementia, GERD, diverticulitis presented with few days of not feeling well, cough, low grade fevers. She was noted to be hypoxic and tachycardic in ED. CT abdomen was done, did not reveal any abdominal pathology, showed a right lower lung cavitary lesion. CTA chest was done, showed bilateral pulmonary emboli with possible pulmonary infarct, no right heart strain. Full dose anticoagulation was initiated. ECHO and lower extremity dopplers were ordered. Pulmonary and ID consultations were requested. ID advised checking QuantiFeron for TB but no need for airborne isolation. She was transitioned to [facility], PT was consulted.

## 2021-02-01 ENCOUNTER — TRANSCRIPTION ENCOUNTER (OUTPATIENT)
Age: 86
End: 2021-02-01

## 2021-02-01 PROCEDURE — 99233 SBSQ HOSP IP/OBS HIGH 50: CPT

## 2021-02-01 PROCEDURE — 99232 SBSQ HOSP IP/OBS MODERATE 35: CPT

## 2021-02-01 RX ORDER — APIXABAN 2.5 MG/1
2 TABLET, FILM COATED ORAL
Qty: 150 | Refills: 0
Start: 2021-02-01 | End: 2021-02-07

## 2021-02-01 RX ADMIN — Medication 0.2 MILLIGRAM(S): at 06:11

## 2021-02-01 RX ADMIN — Medication 50 MILLIGRAM(S): at 23:05

## 2021-02-01 RX ADMIN — Medication 50 MILLIGRAM(S): at 13:11

## 2021-02-01 RX ADMIN — APIXABAN 10 MILLIGRAM(S): 2.5 TABLET, FILM COATED ORAL at 06:12

## 2021-02-01 RX ADMIN — PANTOPRAZOLE SODIUM 40 MILLIGRAM(S): 20 TABLET, DELAYED RELEASE ORAL at 06:11

## 2021-02-01 RX ADMIN — APIXABAN 10 MILLIGRAM(S): 2.5 TABLET, FILM COATED ORAL at 17:17

## 2021-02-01 RX ADMIN — Medication 0.2 MILLIGRAM(S): at 17:18

## 2021-02-01 RX ADMIN — Medication 100 MILLIGRAM(S): at 06:10

## 2021-02-01 RX ADMIN — Medication 40 MILLIGRAM(S): at 06:11

## 2021-02-01 RX ADMIN — Medication 50 MILLIGRAM(S): at 06:10

## 2021-02-01 RX ADMIN — POLYETHYLENE GLYCOL 3350 17 GRAM(S): 17 POWDER, FOR SOLUTION ORAL at 13:12

## 2021-02-01 RX ADMIN — Medication 50 MILLIGRAM(S): at 17:17

## 2021-02-01 RX ADMIN — SENNA PLUS 2 TABLET(S): 8.6 TABLET ORAL at 23:05

## 2021-02-01 RX ADMIN — Medication 5 MILLIGRAM(S): at 13:11

## 2021-02-01 RX ADMIN — SIMVASTATIN 20 MILLIGRAM(S): 20 TABLET, FILM COATED ORAL at 23:06

## 2021-02-01 RX ADMIN — LOSARTAN POTASSIUM 50 MILLIGRAM(S): 100 TABLET, FILM COATED ORAL at 06:11

## 2021-02-01 NOTE — PHYSICAL THERAPY INITIAL EVALUATION ADULT - PERTINENT HX OF CURRENT PROBLEM, REHAB EVAL
pt presents to [facility] due to frank DVT, frank PE, fevers, hypoxia, tachycardiac

## 2021-02-01 NOTE — PROGRESS NOTE ADULT - ASSESSMENT
-PE  -Cavitary lung lesion; infectious etiology vs infarct vs malignancy  -Dementia    Recommendations:  - Sputum culture if able  - BCx and Legionella are negative  - Pt noted with downtrending WBC count and without cough, no fever. Agree with holding on ABx  - Needs repeat outpt CT in 2-3 months  - C/w NOAC for PE  - OOBTC    Kendell Frost M.D.  Pulmonary & Critical Care Medicine  [facility]  Pulmonary Medicine at [place]  [address]  [place] [address]  T: [phone]  F: [phone]

## 2021-02-01 NOTE — PROGRESS NOTE ADULT - PROBLEM SELECTOR PLAN 1
Also has bilateral DVT. Continue Eliquis. Noted to be hypoxic on room air at rest, needs home oxygen. Also has bilateral DVT. Continue Eliquis. Noted to be hypoxic on room air at rest, needs home oxygen. Her pulse ox is 87% on room air at rest.

## 2021-02-01 NOTE — PROGRESS NOTE ADULT - ASSESSMENT
85 yr old female with hypertension, hyperlipidemia, dementia, GERD, diverticulitis presented with few days of not feeling well, cough, low grade fevers. She was noted to be hypoxic and tachycardic in ED. CT abdomen was done, did not reveal any abdominal pathology, showed a right lower lung cavitary lesion. CTA chest was done, showed bilateral pulmonary emboli with possible pulmonary infarct, no right heart strain. Full dose anticoagulation was initiated. ECHO and lower extremity dopplers were ordered. Pulmonary and ID consultations were requested. ID advised checking QuantiFeron for TB but no need for airborne isolation. She was transitioned to [facility], PT was consulted. She was evaluated for home oxygen, noted to be hypoxic on room air at rest, hence home oxygen was arranged.

## 2021-02-01 NOTE — PROGRESS NOTE ADULT - ATTENDING COMMENTS
A total of 25 minutes were spent on the entire encounter. Greater than 50% of the time was spent reviewing labs, notes, orders, radiographic studies, as well as counseling and coordinating care with the relevant multidisciplinary team, including with the primary and consulting providers.

## 2021-02-01 NOTE — PHYSICAL THERAPY INITIAL EVALUATION ADULT - ADDITIONAL COMMENTS
owns and uses a RW, has home O2 for night, no stairs to negotiate, receives assist prn in shower and ADLs

## 2021-02-01 NOTE — DISCHARGE NOTE PROVIDER - NSDCFUADDINST_GEN_ALL_CORE_FT
Continue medications as instructed, if you notice bleeding, call PMD or return to ED immediately. Repeat CT chest in 2-3 months.

## 2021-02-01 NOTE — PHYSICAL THERAPY INITIAL EVALUATION ADULT - GENERAL OBSERVATIONS, REHAB EVAL
pt received semi singh position in bed, NAD, RN and pt agreeable to PT, cardiac monitor, O2 line 1L(RN agreeable to ambulate on room air)

## 2021-02-01 NOTE — DISCHARGE NOTE PROVIDER - NSDCMRMEDTOKEN_GEN_ALL_CORE_FT
cloNIDine 0.2 mg oral tablet: 1  orally 2 times a day  fenofibric acid 135 mg oral delayed release capsule: 1 cap(s) orally once a day  flecainide 50 mg oral tablet: 1 tab(s) orally every 12 hours  hydrALAZINE 100 mg oral tablet: 1 tab(s) orally 3 times a day   Lasix 40 mg oral tablet: 1 tab(s) orally once a day  Metoprolol Succinate  mg oral tablet, extended release: 1 tab(s) orally once a day  omeprazole 20 mg oral delayed release capsule: 1 cap(s) orally once a day  oxybutynin 5 mg/24 hours oral tablet, extended release: 1 tab(s) orally once a day  simvastatin 20 mg oral tablet: 1 tab(s) orally once a day (at bedtime)   cloNIDine 0.2 mg oral tablet: 1  orally 2 times a day  Eliquis Starter Pack for Treatment of DVT and PE 5 mg oral tablet: 2 tab(s) orally 2 times a day for 6 days then 1 tab BID orally for 30 days  fenofibric acid 135 mg oral delayed release capsule: 1 cap(s) orally once a day  flecainide 50 mg oral tablet: 1 tab(s) orally every 12 hours  hydrALAZINE 100 mg oral tablet: 1 tab(s) orally 3 times a day   Lasix 40 mg oral tablet: 1 tab(s) orally once a day  Metoprolol Succinate  mg oral tablet, extended release: 1 tab(s) orally once a day  omeprazole 20 mg oral delayed release capsule: 1 cap(s) orally once a day  oxybutynin 5 mg/24 hours oral tablet, extended release: 1 tab(s) orally once a day  simvastatin 20 mg oral tablet: 1 tab(s) orally once a day (at bedtime)   cloNIDine 0.2 mg oral tablet: 1  orally 2 times a day  Eliquis Starter Pack for Treatment of DVT and PE 5 mg oral tablet: 2 tab(s) orally 2 times a day for 3 days then 1 tab BID orally for 30 days  fenofibric acid 135 mg oral delayed release capsule: 1 cap(s) orally once a day  flecainide 50 mg oral tablet: 1 tab(s) orally every 12 hours  hydrALAZINE 100 mg oral tablet: 1 tab(s) orally 3 times a day   Lasix 40 mg oral tablet: 1 tab(s) orally once a day  Metoprolol Succinate  mg oral tablet, extended release: 1 tab(s) orally once a day  omeprazole 20 mg oral delayed release capsule: 1 cap(s) orally once a day  oxybutynin 5 mg/24 hours oral tablet, extended release: 1 tab(s) orally once a day  simvastatin 20 mg oral tablet: 1 tab(s) orally once a day (at bedtime)

## 2021-02-01 NOTE — DISCHARGE NOTE PROVIDER - HOSPITAL COURSE
85 yr old female with hypertension, hyperlipidemia, dementia, GERD, diverticulitis presented with few days of not feeling well, cough, low grade fevers. She was noted to be hypoxic and tachycardic in ED. CT abdomen was done, did not reveal any abdominal pathology, showed a right lower lung cavitary lesion. CTA chest was done, showed bilateral pulmonary emboli with possible pulmonary infarct, no right heart strain. Full dose anticoagulation was initiated. ECHO and lower extremity dopplers were ordered. Pulmonary and ID consultations were requested. ID advised checking QuantiFeron for TB but no need for airborne isolation. She was transitioned to [facility], PT was consulted. She was noted to be hypoxic at rest and home oxygen was arranged.    85 yr old female with hypertension, hyperlipidemia, dementia, GERD, diverticulitis presented with few days of not feeling well, cough, low grade fevers. She was noted to be hypoxic and tachycardic in ED. CT abdomen was done, did not reveal any abdominal pathology, showed a right lower lung cavitary lesion. CTA chest was done, showed bilateral pulmonary emboli with possible pulmonary infarct, no right heart strain. Full dose anticoagulation was initiated. ECHO and lower extremity dopplers were ordered. Pulmonary and ID consultations were requested. ID advised checking QuantiFeron for TB but no need for airborne isolation. She was transitioned to [facility], PT was consulted. She was noted to be hypoxic at rest and home oxygen was arranged. She was evaluated by PT and advised home care. She is stable for discharge home.    Spent > 35 mins in discharge plan and documentation. 85 yr old female with hypertension, hyperlipidemia, dementia, GERD, diverticulitis presented with few days of not feeling well, cough, low grade fevers. She was noted to be hypoxic and tachycardic in ED. CT abdomen was done, did not reveal any abdominal pathology, showed a right lower lung cavitary lesion. CTA chest was done, showed bilateral pulmonary emboli with possible pulmonary infarct, no right heart strain. Full dose anticoagulation was initiated. ECHO and lower extremity dopplers were ordered. Pulmonary and ID consultations were requested. ID advised checking QuantiFeron for TB but no need for airborne isolation. She was transitioned to [facility], PT was consulted. She was noted to be hypoxic at rest and home oxygen was arranged. She was evaluated by PT and advised home care. She is stable for discharge home.    Spent > 35 mins in discharge plan and documentation.     Vital Signs Last 24 Hrs  T(C): 36.4 (04 Feb 2021 05:11), Max: 36.9 (03 Feb 2021 15:40)  T(F): 97.6 (04 Feb 2021 05:11), Max: 98.5 (03 Feb 2021 15:40)  HR: 73 (04 Feb 2021 05:11) (73 - 85)  BP: 130/77 (04 Feb 2021 05:11) (120/71 - 132/81)  BP(mean): --  RR: 18 (04 Feb 2021 05:11) (17 - 18)  SpO2: 97% (04 Feb 2021 05:11) (95% - 97%)    < from: CT Angio Chest w/ IV Cont (01.29.21 @ 06:43) >    IMPRESSION:  Bilateral pulmonary emboli. No evidence for right heart strain.    Right lower lobe cavitation likely reflects cavitary pulmonary infarct. Differential includes pulmonary abscess and cavitary neoplasm. Follow-up is recommended to ensure resolution.    Additional unenhancing left lower lobe airspace opacities may represent additional infarcts versus concomitant pneumonia.           85 yr old female with hypertension, hyperlipidemia, dementia, GERD, diverticulitis presented with few days of not feeling well, cough, low grade fevers. She was noted to be hypoxic and tachycardic in ED. CT abdomen was done, did not reveal any abdominal pathology, showed a right lower lung cavitary lesion. CTA chest was done, showed bilateral pulmonary emboli with possible pulmonary infarct, no right heart strain. Full dose anticoagulation was initiated. ECHO and lower extremity dopplers were ordered. Pulmonary and ID consultations were requested. ID advised checking QuantiFeron for TB but no need for airborne isolation. She was transitioned to [facility], PT was consulted. She was noted to be hypoxic at rest and home oxygen was arranged. She was evaluated by PT and advised home care. She is stable for discharge home.    Spent > 35 mins in discharge plan and documentation.

## 2021-02-01 NOTE — DISCHARGE NOTE PROVIDER - DETAILS OF MALNUTRITION DIAGNOSIS/DIAGNOSES
This patient has been assessed with a concern for Malnutrition and was treated during this hospitalization for the following Nutrition diagnosis/diagnoses:     -  02/03/2021: Moderate protein-calorie malnutrition   This patient has been assessed with a concern for Malnutrition and was treated during this hospitalization for the following Nutrition diagnosis/diagnoses:     -  02/03/2021: Moderate protein-calorie malnutrition    This patient has been assessed with a concern for Malnutrition and was treated during this hospitalization for the following Nutrition diagnosis/diagnoses:     -  02/03/2021: Moderate protein-calorie malnutrition

## 2021-02-01 NOTE — PHYSICAL THERAPY INITIAL EVALUATION ADULT - GAIT PATTERN USED, PT EVAL
contact guard for safety due to fatigue, decreased activity tolerance, incidental standing rest breaks due to fatigue, decreased upright posture, O2 sat on room air p ambulation 89% rising to 92% p 1L of O2 and sitting on edge of bed.

## 2021-02-01 NOTE — PROGRESS NOTE ADULT - ATTENDING COMMENTS
Discussed with patient and RN.   Discharge planning pending home oxygen arrangement and PT evaluation.  Updated daughter Isabel.

## 2021-02-01 NOTE — DISCHARGE NOTE PROVIDER - NSDCCPCAREPLAN_GEN_ALL_CORE_FT
PRINCIPAL DISCHARGE DIAGNOSIS  Diagnosis: Acute pulmonary embolism  Assessment and Plan of Treatment: Use home oxygen as directed and continue Eliquis.      SECONDARY DISCHARGE DIAGNOSES  Diagnosis: Hypertension  Assessment and Plan of Treatment: Continue medications.    Diagnosis: GERD (gastroesophageal reflux disease)  Assessment and Plan of Treatment: Continue PPI.    Diagnosis: Abnormal CT scan of lung  Assessment and Plan of Treatment: Repeat CT chest in 2 months to check for resolution.

## 2021-02-01 NOTE — DISCHARGE NOTE PROVIDER - NSDCFUADDAPPT_GEN_ALL_CORE_FT
STAR patient : Patient has a prescheduled appointment with Dr Kendell Aponte on Monday 2/8/ 2021 at 11:20 am

## 2021-02-02 PROCEDURE — 99232 SBSQ HOSP IP/OBS MODERATE 35: CPT

## 2021-02-02 RX ADMIN — Medication 40 MILLIGRAM(S): at 06:00

## 2021-02-02 RX ADMIN — Medication 50 MILLIGRAM(S): at 16:25

## 2021-02-02 RX ADMIN — Medication 100 MILLIGRAM(S): at 06:01

## 2021-02-02 RX ADMIN — Medication 5 MILLIGRAM(S): at 12:22

## 2021-02-02 RX ADMIN — Medication 0.2 MILLIGRAM(S): at 06:01

## 2021-02-02 RX ADMIN — Medication 0.2 MILLIGRAM(S): at 16:25

## 2021-02-02 RX ADMIN — APIXABAN 10 MILLIGRAM(S): 2.5 TABLET, FILM COATED ORAL at 16:25

## 2021-02-02 RX ADMIN — Medication 50 MILLIGRAM(S): at 12:22

## 2021-02-02 RX ADMIN — PANTOPRAZOLE SODIUM 40 MILLIGRAM(S): 20 TABLET, DELAYED RELEASE ORAL at 06:00

## 2021-02-02 RX ADMIN — SIMVASTATIN 20 MILLIGRAM(S): 20 TABLET, FILM COATED ORAL at 23:29

## 2021-02-02 RX ADMIN — Medication 50 MILLIGRAM(S): at 06:01

## 2021-02-02 RX ADMIN — APIXABAN 10 MILLIGRAM(S): 2.5 TABLET, FILM COATED ORAL at 06:00

## 2021-02-02 RX ADMIN — LOSARTAN POTASSIUM 50 MILLIGRAM(S): 100 TABLET, FILM COATED ORAL at 06:01

## 2021-02-02 RX ADMIN — Medication 50 MILLIGRAM(S): at 23:30

## 2021-02-02 RX ADMIN — Medication 50 MILLIGRAM(S): at 06:00

## 2021-02-02 NOTE — PROGRESS NOTE ADULT - ASSESSMENT
85 yr old female with hypertension, hyperlipidemia, dementia, GERD, diverticulitis presented with few days of not feeling well, cough, low grade fevers. She was noted to be hypoxic and tachycardic in ED. CT abdomen was done, did not reveal any abdominal pathology, showed a right lower lung cavitary lesion. CTA chest was done, showed bilateral pulmonary emboli with possible pulmonary infarct, no right heart strain. Full dose anticoagulation was initiated. ECHO and lower extremity dopplers were ordered. Pulmonary and ID consultations were requested. ID advised checking QuantiFeron for TB but no need for airborne isolation. She was transitioned to [facility], PT was consulted. She was evaluated for home oxygen, noted to be hypoxic on room air at rest, hence home oxygen was arranged.

## 2021-02-02 NOTE — PROGRESS NOTE ADULT - PROBLEM SELECTOR PLAN 1
Also has bilateral DVT. Continue Eliquis. Noted to be hypoxic on room air at rest, needs home oxygen. Her pulse ox is 87% on room air at rest.
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
## 2021-02-03 LAB
CULTURE RESULTS: SIGNIFICANT CHANGE UP
CULTURE RESULTS: SIGNIFICANT CHANGE UP
SPECIMEN SOURCE: SIGNIFICANT CHANGE UP
SPECIMEN SOURCE: SIGNIFICANT CHANGE UP

## 2021-02-03 PROCEDURE — 99232 SBSQ HOSP IP/OBS MODERATE 35: CPT

## 2021-02-03 RX ORDER — APIXABAN 2.5 MG/1
2 TABLET, FILM COATED ORAL
Qty: 150 | Refills: 0
Start: 2021-02-03 | End: 2021-02-09

## 2021-02-03 RX ADMIN — Medication 100 MILLIGRAM(S): at 05:16

## 2021-02-03 RX ADMIN — POLYETHYLENE GLYCOL 3350 17 GRAM(S): 17 POWDER, FOR SOLUTION ORAL at 10:21

## 2021-02-03 RX ADMIN — APIXABAN 10 MILLIGRAM(S): 2.5 TABLET, FILM COATED ORAL at 05:17

## 2021-02-03 RX ADMIN — Medication 0.2 MILLIGRAM(S): at 16:36

## 2021-02-03 RX ADMIN — Medication 0.2 MILLIGRAM(S): at 05:16

## 2021-02-03 RX ADMIN — SIMVASTATIN 20 MILLIGRAM(S): 20 TABLET, FILM COATED ORAL at 21:16

## 2021-02-03 RX ADMIN — Medication 50 MILLIGRAM(S): at 05:16

## 2021-02-03 RX ADMIN — Medication 40 MILLIGRAM(S): at 05:16

## 2021-02-03 RX ADMIN — SENNA PLUS 2 TABLET(S): 8.6 TABLET ORAL at 21:16

## 2021-02-03 RX ADMIN — PANTOPRAZOLE SODIUM 40 MILLIGRAM(S): 20 TABLET, DELAYED RELEASE ORAL at 05:16

## 2021-02-03 RX ADMIN — Medication 50 MILLIGRAM(S): at 16:36

## 2021-02-03 RX ADMIN — Medication 5 MILLIGRAM(S): at 10:22

## 2021-02-03 RX ADMIN — LOSARTAN POTASSIUM 50 MILLIGRAM(S): 100 TABLET, FILM COATED ORAL at 05:16

## 2021-02-03 RX ADMIN — Medication 50 MILLIGRAM(S): at 14:23

## 2021-02-03 RX ADMIN — APIXABAN 10 MILLIGRAM(S): 2.5 TABLET, FILM COATED ORAL at 16:36

## 2021-02-03 RX ADMIN — Medication 50 MILLIGRAM(S): at 21:16

## 2021-02-03 NOTE — PROGRESS NOTE ADULT - ATTENDING COMMENTS
Patient seen and examined at bedside. No overnight events.  On exam,  alert, not in distress  lungs: b/l air entry  cvs: regular  abdo: soft, bs+  ext: no edema, tenderness  imp:  acute PE and DVT  hypertension  dementia  plan:  continue AC  needs home oxygen  continue rest of medications  discharge home once oxygen is arranged.  discussed with patient, RN and case management.

## 2021-02-03 NOTE — DIETITIAN INITIAL EVALUATION ADULT. - OTHER INFO
85 yr old female with hypertension, hyperlipidemia, dementia, GERD, diverticulitis presented with few days of not feeling well, cough, low grade fevers. She was noted to be hypoxic and tachycardic in ED. CT abdomen was done, did not reveal any abdominal pathology, showed a right lower lung cavitary lesion. CTA chest was done, showed bilateral pulmonary emboli with possible pulmonary infarct, no right heart strain. She was evaluated for home oxygen, noted to be hypoxic on room air at rest, hence home oxygen was arranged. Aware Pt with c/o nausea, abdominal pain, poor PO intake and no BM x few days PTA. Pt poor historian noted, unaware of whether she ate lunch, unaware of recent weight changes/UBW. Per RN Pt didn't like the lunch offered, also refusing chicken soup. No PO documented in house though suspect suboptimal PO intake, last BM 2/1.

## 2021-02-03 NOTE — DIETITIAN NUTRITION RISK NOTIFICATION - ADDITIONAL COMMENTS/DIETITIAN RECOMMENDATIONS
Recommend Ensure Enlive TID; Rx MVI daily; Monitor weights daily for trend/accuracy; Provide encouragement/assistance as needed during mealtimes to inc PO

## 2021-02-03 NOTE — DIETITIAN INITIAL EVALUATION ADULT. - ADD RECOMMEND
Add Ensure Enlive TID; Rx MVI daily; Provide encouragement/assistance as needed during mealtimes to inc PO

## 2021-02-03 NOTE — DIETITIAN INITIAL EVALUATION ADULT. - ETIOLOGY
related to inability to consume sufficient protein-energy needs 2/2 abdominal pain, nausea, constipation 2/2 B/L PE with acute B/L LE DVT

## 2021-02-03 NOTE — PROGRESS NOTE ADULT - ASSESSMENT
85 yr old female with hypertension, hyperlipidemia, dementia, GERD, diverticulitis presented with few days of not feeling well, cough, low grade fevers. She was noted to be hypoxic and tachycardic in ED. CT abdomen was done, did not reveal any abdominal pathology, showed a right lower lung cavitary lesion. CTA chest was done, showed bilateral pulmonary emboli with possible pulmonary infarct, no right heart strain. Full dose anticoagulation was initiated. ECHO and lower extremity dopplers were ordered. Pulmonary and ID consultations were requested. ID advised checking QuantiFeron for TB but no need for airborne isolation. She was transitioned to [facility], PT was consulted. She was evaluated for home oxygen, noted to be hypoxic on room air at rest, hence home oxygen was arranged.

## 2021-02-04 VITALS
TEMPERATURE: 98 F | SYSTOLIC BLOOD PRESSURE: 135 MMHG | OXYGEN SATURATION: 98 % | DIASTOLIC BLOOD PRESSURE: 81 MMHG | HEART RATE: 76 BPM | RESPIRATION RATE: 18 BRPM

## 2021-02-04 PROCEDURE — 93970 EXTREMITY STUDY: CPT

## 2021-02-04 PROCEDURE — 97116 GAIT TRAINING THERAPY: CPT

## 2021-02-04 PROCEDURE — 36415 COLL VENOUS BLD VENIPUNCTURE: CPT

## 2021-02-04 PROCEDURE — 87449 NOS EACH ORGANISM AG IA: CPT

## 2021-02-04 PROCEDURE — 93005 ELECTROCARDIOGRAM TRACING: CPT

## 2021-02-04 PROCEDURE — 93306 TTE W/DOPPLER COMPLETE: CPT

## 2021-02-04 PROCEDURE — 96365 THER/PROPH/DIAG IV INF INIT: CPT

## 2021-02-04 PROCEDURE — 74176 CT ABD & PELVIS W/O CONTRAST: CPT

## 2021-02-04 PROCEDURE — 85025 COMPLETE CBC W/AUTO DIFF WBC: CPT

## 2021-02-04 PROCEDURE — 86480 TB TEST CELL IMMUN MEASURE: CPT

## 2021-02-04 PROCEDURE — 82272 OCCULT BLD FECES 1-3 TESTS: CPT

## 2021-02-04 PROCEDURE — 83690 ASSAY OF LIPASE: CPT

## 2021-02-04 PROCEDURE — 97530 THERAPEUTIC ACTIVITIES: CPT

## 2021-02-04 PROCEDURE — 99239 HOSP IP/OBS DSCHRG MGMT >30: CPT

## 2021-02-04 PROCEDURE — 86140 C-REACTIVE PROTEIN: CPT

## 2021-02-04 PROCEDURE — 86769 SARS-COV-2 COVID-19 ANTIBODY: CPT

## 2021-02-04 PROCEDURE — 87086 URINE CULTURE/COLONY COUNT: CPT

## 2021-02-04 PROCEDURE — 99285 EMERGENCY DEPT VISIT HI MDM: CPT | Mod: 25

## 2021-02-04 PROCEDURE — 81001 URINALYSIS AUTO W/SCOPE: CPT

## 2021-02-04 PROCEDURE — 87040 BLOOD CULTURE FOR BACTERIA: CPT

## 2021-02-04 PROCEDURE — 85379 FIBRIN DEGRADATION QUANT: CPT

## 2021-02-04 PROCEDURE — 84100 ASSAY OF PHOSPHORUS: CPT

## 2021-02-04 PROCEDURE — 80048 BASIC METABOLIC PNL TOTAL CA: CPT

## 2021-02-04 PROCEDURE — 71045 X-RAY EXAM CHEST 1 VIEW: CPT

## 2021-02-04 PROCEDURE — 80053 COMPREHEN METABOLIC PANEL: CPT

## 2021-02-04 PROCEDURE — 0225U NFCT DS DNA&RNA 21 SARSCOV2: CPT

## 2021-02-04 PROCEDURE — 85027 COMPLETE CBC AUTOMATED: CPT

## 2021-02-04 PROCEDURE — 82728 ASSAY OF FERRITIN: CPT

## 2021-02-04 PROCEDURE — 97110 THERAPEUTIC EXERCISES: CPT

## 2021-02-04 PROCEDURE — 83735 ASSAY OF MAGNESIUM: CPT

## 2021-02-04 PROCEDURE — 71275 CT ANGIOGRAPHY CHEST: CPT

## 2021-02-04 PROCEDURE — 84145 PROCALCITONIN (PCT): CPT

## 2021-02-04 PROCEDURE — 97163 PT EVAL HIGH COMPLEX 45 MIN: CPT

## 2021-02-04 PROCEDURE — 87186 SC STD MICRODIL/AGAR DIL: CPT

## 2021-02-04 RX ADMIN — Medication 100 MILLIGRAM(S): at 04:44

## 2021-02-04 RX ADMIN — Medication 50 MILLIGRAM(S): at 04:44

## 2021-02-04 RX ADMIN — PANTOPRAZOLE SODIUM 40 MILLIGRAM(S): 20 TABLET, DELAYED RELEASE ORAL at 04:45

## 2021-02-04 RX ADMIN — APIXABAN 10 MILLIGRAM(S): 2.5 TABLET, FILM COATED ORAL at 04:44

## 2021-02-04 RX ADMIN — POLYETHYLENE GLYCOL 3350 17 GRAM(S): 17 POWDER, FOR SOLUTION ORAL at 10:52

## 2021-02-04 RX ADMIN — Medication 0.2 MILLIGRAM(S): at 04:44

## 2021-02-04 RX ADMIN — Medication 5 MILLIGRAM(S): at 10:52

## 2021-02-04 RX ADMIN — Medication 40 MILLIGRAM(S): at 04:44

## 2021-02-04 RX ADMIN — LOSARTAN POTASSIUM 50 MILLIGRAM(S): 100 TABLET, FILM COATED ORAL at 04:44

## 2021-02-04 NOTE — PROGRESS NOTE ADULT - REASON FOR ADMISSION
hypoxia, cavitary lung lesion

## 2021-02-04 NOTE — PROGRESS NOTE ADULT - SUBJECTIVE AND OBJECTIVE BOX
INTERVAL HPI/OVERNIGHT EVENTS:    CC: bilateral pulmonary embolism and acute bilateral lower extremity DVT, hypertension, hyperlipidemia, dementia      No overnight events, oxygen arranged. Denies complaints.    Vital Signs Last 24 Hrs  T(C): 36.9 (04 Feb 2021 09:40), Max: 36.9 (03 Feb 2021 15:40)  T(F): 98.5 (04 Feb 2021 09:40), Max: 98.5 (03 Feb 2021 15:40)  HR: 73 (04 Feb 2021 09:40) (73 - 85)  BP: 99/60 (04 Feb 2021 09:40) (99/60 - 132/81)  BP(mean): --  RR: 18 (04 Feb 2021 09:40) (17 - 18)  SpO2: 99% (04 Feb 2021 09:40) (95% - 99%)    PHYSICAL EXAM:    GENERAL: alert, not in distress  CHEST/LUNG: b/l air entry  HEART: reg  ABDOMEN: soft, bs+  EXTREMITIES:  1+ edema, non tender    MEDICATIONS  (STANDING):  apixaban 10 milliGRAM(s) Oral every 12 hours  cloNIDine 0.2 milliGRAM(s) Oral two times a day  flecainide 50 milliGRAM(s) Oral every 12 hours  furosemide    Tablet 40 milliGRAM(s) Oral daily  hydrALAZINE 50 milliGRAM(s) Oral every 8 hours  losartan 50 milliGRAM(s) Oral daily  metoprolol succinate  milliGRAM(s) Oral daily  oxybutynin 5 milliGRAM(s) Oral daily  pantoprazole    Tablet 40 milliGRAM(s) Oral before breakfast  polyethylene glycol 3350 17 Gram(s) Oral daily  senna 2 Tablet(s) Oral at bedtime  simvastatin 20 milliGRAM(s) Oral at bedtime    MEDICATIONS  (PRN):      Allergies    sulfa drugs (Rash)    Intolerances          LABS:                  RADIOLOGY & ADDITIONAL TESTS:  
PULMONARY PROGRESS NOTE      CHAYO SHAW-[id number]    Patient is a 85y old  Female who presents with a chief complaint of hypoxia, cavitary lung lesion (30 Jan 2021 07:58)      INTERVAL HPI/OVERNIGHT EVENTS: Poor historian. No cough. No sob.     MEDICATIONS  (STANDING):  cloNIDine 0.2 milliGRAM(s) Oral two times a day  enoxaparin Injectable 65 milliGRAM(s) SubCutaneous every 12 hours  flecainide 50 milliGRAM(s) Oral every 12 hours  furosemide    Tablet 40 milliGRAM(s) Oral daily  hydrALAZINE 50 milliGRAM(s) Oral every 8 hours  losartan 50 milliGRAM(s) Oral daily  metoprolol succinate  milliGRAM(s) Oral daily  oxybutynin 5 milliGRAM(s) Oral daily  pantoprazole    Tablet 40 milliGRAM(s) Oral before breakfast  polyethylene glycol 3350 17 Gram(s) Oral daily  senna 2 Tablet(s) Oral at bedtime  simvastatin 20 milliGRAM(s) Oral at bedtime      MEDICATIONS  (PRN):  morphine  - Injectable 2 milliGRAM(s) IV Push every 4 hours PRN Severe Pain (7 - 10)      Allergies    sulfa drugs (Rash)    Intolerances        PAST MEDICAL & SURGICAL HISTORY:  Cardiac rhythm disturbance    Diverticulitis    Hiatal hernia    HLD (hyperlipidemia)    GERD (gastroesophageal reflux disease)    HTN (hypertension)    History of cholecystectomy               REVIEW OF SYSTEMS:    unable to get ROS    Vital Signs Last 24 Hrs  T(C): 36.7 (30 Jan 2021 07:31), Max: 37.6 (29 Jan 2021 19:06)  T(F): 98 (30 Jan 2021 07:31), Max: 99.7 (29 Jan 2021 19:06)  HR: 77 (30 Jan 2021 07:31) (77 - 84)  BP: 123/75 (30 Jan 2021 07:31) (123/60 - 156/71)  BP(mean): --  RR: 19 (30 Jan 2021 07:31) (19 - 24)  SpO2: 93% (30 Jan 2021 07:31) (92% - 95%)    PHYSICAL EXAMINATION:    GENERAL: The patient is awake and alert in no apparent distress.     HEENT: Head is normocephalic and atraumatic.  Mucous membranes are moist.    NECK: Supple.    LUNGS: Clear to auscultation without wheezing, rales or rhonchi; respirations unlabored    HEART: Regular rate and rhythm without murmur.    ABDOMEN: Soft, nontender, and nondistended.      EXTREMITIES: Without any cyanosis, clubbing, rash, lesions or edema.    NEUROLOGIC: Grossly intact.           Procalcitonin, Serum: 0.16 ng/mL (01-29-21 @ 04:03)      MICROBIOLOGY:  Legionella Antigen, Urine: Negative: Negative Testing method: Immunochromatographic Assay. L. pneumpohila  serogroup 1 antigen in urine NOT detected, suggesting NO recent or  current infection. Infection due to Legionella cannot be ruled out: other  serogroups and species may cause disease, antigen may not be present in  urine in early infection, or the level of antigens in urine may be below  the detection limit of the test.  Order “Culture –Legionella” is  recommended for uncommon cases of suspected Legionella pneumonia due to  organisms other than L. pneumophila serogroup 1. (01.30.21 @ 02:21)    < from: TTE Echo Complete w/o Contrast w/ Doppler (01.29.21 @ 11:36) >  Summary:   1. Endocardial visualization was enhanced with intravenous echo contrast.   2. Left ventricular ejection fraction, by visual estimation, is 60 to 65%.   3. Normal global left ventricular systolic function.   4. Spectral Doppler shows impaired relaxation pattern of left ventricular myocardial filling (Grade I diastolic dysfunction).   5. Mild to moderately enlarged left atrium.   6. Mild mitral annular calcification.   7. Thickening of the anterior and posterior mitral valve leaflets.   8. Trace mitral valve regurgitation.   9. Mild tricuspid regurgitation.  10. Estimated pulmonary artery systolic pressure is 39.2 mmHg assuming a right atrial pressure of 3 mmHg, which is consistent with borderline pulmonary hypertension.  11. No prior studies available for comparison.    JAMAAL Echeverria Electronically signed on 1/29/2021 at 12:54:14 PM            *** Final ***              GORDON SHAW   This document has been electronically signed. Jan 29 2021 11:36AM    < end of copied text >        
PULMONARY PROGRESS NOTE      DANIEL SHAW  MRN-[id number]    Patient is a 85y old  Female who presents with a chief complaint of hypoxia, cavitary lung lesion (01 Feb 2021 10:23)        INTERVAL HPI/OVERNIGHT EVENTS:  Pt seen and examined at the bedside.    MEDICATIONS  (STANDING):  apixaban 10 milliGRAM(s) Oral every 12 hours  cloNIDine 0.2 milliGRAM(s) Oral two times a day  flecainide 50 milliGRAM(s) Oral every 12 hours  furosemide    Tablet 40 milliGRAM(s) Oral daily  hydrALAZINE 50 milliGRAM(s) Oral every 8 hours  losartan 50 milliGRAM(s) Oral daily  metoprolol succinate  milliGRAM(s) Oral daily  oxybutynin 5 milliGRAM(s) Oral daily  pantoprazole    Tablet 40 milliGRAM(s) Oral before breakfast  polyethylene glycol 3350 17 Gram(s) Oral daily  senna 2 Tablet(s) Oral at bedtime  simvastatin 20 milliGRAM(s) Oral at bedtime    MEDICATIONS  (PRN):    Allergies    sulfa drugs (Rash)    Intolerances      PAST MEDICAL & SURGICAL HISTORY:  Cardiac rhythm disturbance    Diverticulitis    Hiatal hernia    HLD (hyperlipidemia)    GERD (gastroesophageal reflux disease)    HTN (hypertension)    History of cholecystectomy          REVIEW OF SYSTEMS:  Negative except as noted in HPI      Vital Signs Last 24 Hrs  T(C): 36.9 (01 Feb 2021 15:31), Max: 36.9 (01 Feb 2021 15:31)  T(F): 98.4 (01 Feb 2021 15:31), Max: 98.4 (01 Feb 2021 15:31)  HR: 82 (01 Feb 2021 15:31) (72 - 82)  BP: 138/79 (01 Feb 2021 15:31) (112/72 - 153/83)  BP(mean): --  RR: 16 (01 Feb 2021 15:31) (16 - 18)  SpO2: 93% (01 Feb 2021 15:31) (86% - 95%)      PHYSICAL EXAMINATION:  GEN: In no apparent distress  HEENT: NC/AT  NECK: Supple  CV: +S1, S2  RESPIRATORY: Coarse breath sounds anteriorly, good inspiratory effort, non-labored breathing  ABDOMEN: Soft, NT/ND  EXTREMITIES: No rashes, lesions, or pitting edema noted  NEURO: Grossly non-focal  PSYCH: Normal affect      LABS:                        10.5   12.75 )-----------( 278      ( 31 Jan 2021 08:15 )             33.1     01-31    134<L>  |  94<L>  |  24.0<H>  ----------------------------<  129<H>  3.5   |  30.0<H>  |  0.96    Ca    9.7      31 Jan 2021 08:15                          MICROBIOLOGY:    Respiratory Viral Panel with COVID-19 by [facility] (01.29.21 @ 04:04)    Rapid RVP Result: [facility]    SARS-CoV-2: [facility]: This Respiratory Panel uses polymerase chain reaction (PCR) to detect for  adenovirus; coronavirus (HKU1, NL63, 229E, OC43); human metapneumovirus  (hMPV); human enterovirus/rhinovirus (Entero/RV); influenza A; influenza  A/H1; influenza A/H3; influenza A/H1-2009; influenza B; parainfluenza  viruses 1, 2, 3, 4; respiratory syncytial virus; Mycoplasma pneumoniae;  Chlamydophila pneumoniae; and SARS-CoV-2.      RADIOLOGY & ADDITIONAL STUDIES:  < from: CT Angio Chest w/ IV Cont (01.29.21 @ 06:43) >   EXAM:  CT ANGIO CHEST (W)AW IC                          PROCEDURE DATE:  01/29/2021          INTERPRETATION:  CLINICAL INFORMATION: Elevated d-dimer. Abdominal CT demonstrated a cavitary lesion in the right lower lobe.    COMPARISON: CT chest 3/15/2011    PROCEDURE:  CT Angiography of the Chest.  55 ml of Visipaque 320 was injected intravenously. 45 ml were discarded.  Sagittal and coronal reformats were performed as well as 3D (MIP) reconstructions.    FINDINGS:    LUNGS AND AIRWAYS: Patent central airways.  Right lower lobe cavitation with surrounding unenhancing airspace opacity measures 3.3 x 2.0 cm. Unenhancing left lower lobe opacities. Subsegmental left lower lobe atelectasis.  PLEURA: Trace left pleural effusion.  MEDIASTINUM AND GAETANO: No lymphadenopathy.  VESSELS: Left lower lobe segmental through subsegmental pulmonary emboli. Right upper and lower lobar through subsegmental pulmonary emboli. Right middle lobar pulmonary embolus.  HEART: Heart size is normal. No pericardial effusion.  CHEST WALL AND LOWER NECK: Within normal limits.  VISUALIZED UPPER ABDOMEN: Within normal limits.  BONES: Within normal limits.    IMPRESSION:  Bilateral pulmonary emboli. No evidence for right heart strain.    Right lower lobe cavitation likely reflects cavitary pulmonary infarct. Differential includes pulmonary abscess and cavitary neoplasm. Follow-up is recommended to ensure resolution.    Additional unenhancing left lower lobe airspace opacities may represent additional infarcts versus concomitant pneumonia.    Findings were discussed with Dr. SYDNEE PANTOJA [phone] 1/29/2021 6:53 AM by Dr. Salinas with read back confirmation.    < end of copied text >      Antibiotics Course:    piperacillin/tazobactam IVPB...   200 mL/Hr (01-29-21 @ 05:00)    
INTERVAL HPI/OVERNIGHT EVENTS:    CC: bilateral pulmonary embolism and acute bilateral lower extremity DVT, hypertension, hyperlipidemia, dementia      No overnight events, denies complaints. No shortness of breath, chest pain.    Vital Signs Last 24 Hrs  T(C): 36.7 (01 Feb 2021 09:10), Max: 36.7 (01 Feb 2021 06:07)  T(F): 98 (01 Feb 2021 09:10), Max: 98.1 (01 Feb 2021 06:07)  HR: 74 (01 Feb 2021 09:10) (74 - 85)  BP: 112/72 (01 Feb 2021 09:10) (112/72 - 176/91)  BP(mean): --  RR: 18 (01 Feb 2021 09:10) (16 - 18)  SpO2: 93% (01 Feb 2021 09:10) (86% - 97%)    PHYSICAL EXAM:    GENERAL: alert, not in distress  CHEST/LUNG: b/l air entry  HEART: reg  ABDOMEN: soft, bs+  EXTREMITIES:  1+ edema, non tender    MEDICATIONS  (STANDING):  apixaban 10 milliGRAM(s) Oral every 12 hours  cloNIDine 0.2 milliGRAM(s) Oral two times a day  flecainide 50 milliGRAM(s) Oral every 12 hours  furosemide    Tablet 40 milliGRAM(s) Oral daily  hydrALAZINE 50 milliGRAM(s) Oral every 8 hours  losartan 50 milliGRAM(s) Oral daily  metoprolol succinate  milliGRAM(s) Oral daily  oxybutynin 5 milliGRAM(s) Oral daily  pantoprazole    Tablet 40 milliGRAM(s) Oral before breakfast  polyethylene glycol 3350 17 Gram(s) Oral daily  senna 2 Tablet(s) Oral at bedtime  simvastatin 20 milliGRAM(s) Oral at bedtime    MEDICATIONS  (PRN):      Allergies    sulfa drugs (Rash)    Intolerances          LABS:                          10.5   12.75 )-----------( 278      ( 31 Jan 2021 08:15 )             33.1     01-31    134<L>  |  94<L>  |  24.0<H>  ----------------------------<  129<H>  3.5   |  30.0<H>  |  0.96    Ca    9.7      31 Jan 2021 08:15            RADIOLOGY & ADDITIONAL TESTS:  
INTERVAL HPI/OVERNIGHT EVENTS:    CC: bilateral pulmonary embolism and acute bilateral lower extremity DVT, hypertension, hyperlipidemia, dementia      Chart and course reviewed, states she feels ok, denies shortness of breath, chest pain    Vital Signs Last 24 Hrs  T(C): 36.7 (2021 07:31), Max: 37.6 (2021 19:06)  T(F): 98 (2021 07:31), Max: 99.7 (2021 19:06)  HR: 77 (2021 07:31) (77 - 84)  BP: 123/75 (2021 07:31) (123/60 - 156/72)  BP(mean): --  RR: 19 (2021 07:31) (19 - 24)  SpO2: 93% (2021 07:31) (92% - 95%)    PHYSICAL EXAM:    GENERAL: alert, not in distress  CHEST/LUNG: b/l air entry  HEART: reg  ABDOMEN: soft, bs+  EXTREMITIES:  1+ edema, mild tenderness    MEDICATIONS  (STANDING):  cloNIDine 0.2 milliGRAM(s) Oral two times a day  enoxaparin Injectable 65 milliGRAM(s) SubCutaneous every 12 hours  flecainide 50 milliGRAM(s) Oral every 12 hours  furosemide    Tablet 40 milliGRAM(s) Oral daily  hydrALAZINE 50 milliGRAM(s) Oral every 8 hours  losartan 50 milliGRAM(s) Oral daily  metoprolol succinate  milliGRAM(s) Oral daily  oxybutynin 5 milliGRAM(s) Oral daily  pantoprazole    Tablet 40 milliGRAM(s) Oral before breakfast  polyethylene glycol 3350 17 Gram(s) Oral daily  senna 2 Tablet(s) Oral at bedtime  simvastatin 20 milliGRAM(s) Oral at bedtime    MEDICATIONS  (PRN):  morphine  - Injectable 2 milliGRAM(s) IV Push every 4 hours PRN Severe Pain (7 - 10)      Allergies    sulfa drugs (Rash)    Intolerances          LABS:                          11.0   13.01 )-----------( 250      ( 2021 05:41 )             34.4     01-30    138  |  95<L>  |  23.0<H>  ----------------------------<  117<H>  3.6   |  29.0  |  1.10    Ca    9.5      2021 05:41  Phos  4.2       Mg     2.3         TPro  7.4  /  Alb  3.2<L>  /  TBili  0.7  /  DBili  x   /  AST  37<H>  /  ALT  46<H>  /  AlkPhos  54        Urinalysis Basic - ( 2021 23:16 )    Color: Yellow / Appearance: Slightly Turbid / S.020 / pH: x  Gluc: x / Ketone: Negative  / Bili: Negative / Urobili: Negative mg/dL   Blood: x / Protein: Negative mg/dL / Nitrite: Negative   Leuk Esterase: Moderate / RBC: 0-2 /HPF / WBC 6-10   Sq Epi: x / Non Sq Epi: Moderate / Bacteria: x        RADIOLOGY & ADDITIONAL TESTS:  
INTERVAL HPI/OVERNIGHT EVENTS:    CC: bilateral pulmonary embolism and acute bilateral lower extremity DVT, hypertension, hyperlipidemia, dementia    No overnight events, no shortness of breath, fever.    Vital Signs Last 24 Hrs  T(C): 36.7 (2021 08:06), Max: 37.1 (2021 16:27)  T(F): 98.1 (2021 08:06), Max: 98.7 (2021 16:27)  HR: 79 (2021 08:06) (79 - 102)  BP: 108/66 (2021 08:06) (108/66 - 142/75)  BP(mean): --  RR: 20 (2021 08:06) (19 - 20)  SpO2: 95% (2021 08:06) (91% - 95%)    PHYSICAL EXAM:    GENERAL: alert, not in distress  CHEST/LUNG: b/l air entry  HEART: regular  ABDOMEN: soft, non tender  EXTREMITIES:  1+ edema, non tender    MEDICATIONS  (STANDING):  apixaban 10 milliGRAM(s) Oral every 12 hours  cloNIDine 0.2 milliGRAM(s) Oral two times a day  flecainide 50 milliGRAM(s) Oral every 12 hours  furosemide    Tablet 40 milliGRAM(s) Oral daily  hydrALAZINE 50 milliGRAM(s) Oral every 8 hours  losartan 50 milliGRAM(s) Oral daily  metoprolol succinate  milliGRAM(s) Oral daily  oxybutynin 5 milliGRAM(s) Oral daily  pantoprazole    Tablet 40 milliGRAM(s) Oral before breakfast  polyethylene glycol 3350 17 Gram(s) Oral daily  senna 2 Tablet(s) Oral at bedtime  simvastatin 20 milliGRAM(s) Oral at bedtime    MEDICATIONS  (PRN):  morphine  - Injectable 2 milliGRAM(s) IV Push every 4 hours PRN Severe Pain (7 - 10)      Allergies    sulfa drugs (Rash)    Intolerances          LABS:                          10.5   12.75 )-----------( 278      ( 2021 08:15 )             33.1     01-    134<L>  |  94<L>  |  24.0<H>  ----------------------------<  129<H>  3.5   |  30.0<H>  |  0.96    Ca    9.7      2021 08:15        Urinalysis Basic - ( 2021 23:16 )    Color: Yellow / Appearance: Slightly Turbid / S.020 / pH: x  Gluc: x / Ketone: Negative  / Bili: Negative / Urobili: Negative mg/dL   Blood: x / Protein: Negative mg/dL / Nitrite: Negative   Leuk Esterase: Moderate / RBC: 0-2 /HPF / WBC 6-10   Sq Epi: x / Non Sq Epi: Moderate / Bacteria: x        RADIOLOGY & ADDITIONAL TESTS:  
INTERVAL HPI/OVERNIGHT EVENTS:    CC: bilateral pulmonary embolism and acute bilateral lower extremity DVT, hypertension, hyperlipidemia, dementia      No overnight events. No shortness of breath, chest pain. SW arranging for Home with home O2.      Vital Signs Last 24 Hrs  T(C): 36.7 (03 Feb 2021 08:00), Max: 36.8 (02 Feb 2021 15:41)  T(F): 98 (03 Feb 2021 08:00), Max: 98.2 (02 Feb 2021 15:41)  HR: 80 (03 Feb 2021 08:00) (80 - 87)  BP: 108/66 (03 Feb 2021 08:00) (108/66 - 155/79)  BP(mean): --  RR: 18 (03 Feb 2021 08:00) (18 - 20)  SpO2: 93% (03 Feb 2021 08:00) (87% - 93%)    PHYSICAL EXAM:    GENERAL: alert, not in distress  CHEST/LUNG: b/l air entry, no wheeze,   HEART: reg  ABDOMEN: soft, bs+  EXTREMITIES:  1+ edema, non tender    MEDICATIONS  (STANDING):  apixaban 10 milliGRAM(s) Oral every 12 hours  cloNIDine 0.2 milliGRAM(s) Oral two times a day  flecainide 50 milliGRAM(s) Oral every 12 hours  furosemide    Tablet 40 milliGRAM(s) Oral daily  hydrALAZINE 50 milliGRAM(s) Oral every 8 hours  losartan 50 milliGRAM(s) Oral daily  metoprolol succinate  milliGRAM(s) Oral daily  oxybutynin 5 milliGRAM(s) Oral daily  pantoprazole    Tablet 40 milliGRAM(s) Oral before breakfast  polyethylene glycol 3350 17 Gram(s) Oral daily  senna 2 Tablet(s) Oral at bedtime  simvastatin 20 milliGRAM(s) Oral at bedtime    MEDICATIONS  (PRN):      Allergies    sulfa drugs (Rash)    Intolerances          LABS:                  RADIOLOGY & ADDITIONAL TESTS:  
INTERVAL HPI/OVERNIGHT EVENTS:    CC: bilateral pulmonary embolism and acute bilateral lower extremity DVT, hypertension, hyperlipidemia, dementia      No overnight events, feels tired. No shortness of breath, chest pain. PT evaluation done this morning, ? needs DAVID pending ability of family to assist.    Vital Signs Last 24 Hrs  T(C): 36.8 (02 Feb 2021 08:45), Max: 37.1 (02 Feb 2021 05:13)  T(F): 98.2 (02 Feb 2021 08:45), Max: 98.7 (02 Feb 2021 05:13)  HR: 80 (02 Feb 2021 08:45) (72 - 85)  BP: 115/74 (02 Feb 2021 08:45) (115/74 - 160/82)  BP(mean): 102 (02 Feb 2021 05:13) (102 - 102)  RR: 18 (02 Feb 2021 08:45) (16 - 18)  SpO2: 94% (02 Feb 2021 08:45) (92% - 94%)    PHYSICAL EXAM:    GENERAL: alert, not in distress  CHEST/LUNG: b/l air entry  HEART: reg  ABDOMEN: soft, bs+  EXTREMITIES:  1+ edema, non tender    MEDICATIONS  (STANDING):  apixaban 10 milliGRAM(s) Oral every 12 hours  cloNIDine 0.2 milliGRAM(s) Oral two times a day  flecainide 50 milliGRAM(s) Oral every 12 hours  furosemide    Tablet 40 milliGRAM(s) Oral daily  hydrALAZINE 50 milliGRAM(s) Oral every 8 hours  losartan 50 milliGRAM(s) Oral daily  metoprolol succinate  milliGRAM(s) Oral daily  oxybutynin 5 milliGRAM(s) Oral daily  pantoprazole    Tablet 40 milliGRAM(s) Oral before breakfast  polyethylene glycol 3350 17 Gram(s) Oral daily  senna 2 Tablet(s) Oral at bedtime  simvastatin 20 milliGRAM(s) Oral at bedtime    MEDICATIONS  (PRN):      Allergies    sulfa drugs (Rash)    Intolerances          LABS:                  RADIOLOGY & ADDITIONAL TESTS:

## 2021-02-04 NOTE — PROGRESS NOTE ADULT - ASSESSMENT
85 yr old female with hypertension, hyperlipidemia, dementia, GERD, diverticulitis presented with few days of not feeling well, cough, low grade fevers. She was noted to be hypoxic and tachycardic in ED. CT abdomen was done, did not reveal any abdominal pathology, showed a right lower lung cavitary lesion. CTA chest was done, showed bilateral pulmonary emboli with possible pulmonary infarct, no right heart strain. Full dose anticoagulation was initiated. ECHO and lower extremity dopplers were ordered. Pulmonary and ID consultations were requested. ID advised checking QuantiFeron for TB but no need for airborne isolation. She was transitioned to [facility], PT was consulted. She was evaluated for home oxygen, noted to be hypoxic on room air at rest, hence home oxygen was arranged.

## 2021-02-04 NOTE — PROGRESS NOTE ADULT - NUTRITIONAL ASSESSMENT
This patient has been assessed with a concern for Malnutrition and has been determined to have a diagnosis/diagnoses of Moderate protein-calorie malnutrition.    This patient is being managed with:   Diet DASH/TLC-  Sodium & Cholesterol Restricted  Entered: Jan 29 2021  5:15AM

## 2021-02-04 NOTE — PROGRESS NOTE ADULT - PROBLEM SELECTOR PROBLEM 2
Cardiac rhythm disturbance

## 2021-02-04 NOTE — PROGRESS NOTE ADULT - PROBLEM SELECTOR PLAN 1
Also has bilateral DVT. Continue Eliquis. Noted to be hypoxic on room air at rest, needs home oxygen. Her pulse ox is 87% on room air at rest, explained need for oxygen.

## 2021-02-04 NOTE — PROGRESS NOTE ADULT - PROBLEM SELECTOR PLAN 2
Continue Flecainide

## 2021-02-04 NOTE — PROGRESS NOTE ADULT - PROBLEM SELECTOR PLAN 4
Continue Losartan, Hydralazine, Metoprolol and Lasix.

## 2021-02-04 NOTE — PROGRESS NOTE ADULT - PROBLEM SELECTOR PROBLEM 6
Abnormal CT scan of lung

## 2021-02-04 NOTE — PROGRESS NOTE ADULT - PROBLEM SELECTOR PLAN 6
QuantiFeron negative. No fever, cough or signs of pneumonia. WBC trending down. Defer antibiotics for now. ID and pulm consults noted. Check CT chest in 2-3 months to check for resolution.
QuantiFeron negative. No fever, cough or signs of pneumonia. Defer antibiotics for now. ID and pulm consults noted. Check CT chest in 2-3 months to check for resolution.
QuantiFeron negative. No fever, cough or signs of pneumonia. Defer antibiotics for now. ID and pulm consults noted. Check CT chest in 2-3 months to check for resolution.
QuantiFeron negative. No fever, cough or signs of pneumonia. WBC trending down. Defer antibiotics for now. ID and pulm consults noted. Check CT chest in 2-3 months to check for resolution.
QuantiFeron negative. No fever, cough or signs of pneumonia. Defer antibiotics for now. ID and pulm consults noted. Check CT chest in 2-3 months to check for resolution.

## 2021-02-11 ENCOUNTER — APPOINTMENT (OUTPATIENT)
Dept: PULMONOLOGY | Facility: CLINIC | Age: 86
End: 2021-02-11
Payer: MEDICARE

## 2021-02-11 VITALS
SYSTOLIC BLOOD PRESSURE: 140 MMHG | BODY MASS INDEX: 46.33 KG/M2 | TEMPERATURE: 97.1 F | HEIGHT: 60 IN | WEIGHT: 236 LBS | HEART RATE: 79 BPM | OXYGEN SATURATION: 95 % | DIASTOLIC BLOOD PRESSURE: 70 MMHG

## 2021-02-11 VITALS — RESPIRATION RATE: 16 BRPM

## 2021-02-11 DIAGNOSIS — Z87.39 PERSONAL HISTORY OF OTHER DISEASES OF THE MUSCULOSKELETAL SYSTEM AND CONNECTIVE TISSUE: ICD-10-CM

## 2021-02-11 DIAGNOSIS — Z86.79 PERSONAL HISTORY OF OTHER DISEASES OF THE CIRCULATORY SYSTEM: ICD-10-CM

## 2021-02-11 DIAGNOSIS — Z86.39 PERSONAL HISTORY OF OTHER ENDOCRINE, NUTRITIONAL AND METABOLIC DISEASE: ICD-10-CM

## 2021-02-11 PROCEDURE — 99496 TRANSJ CARE MGMT HIGH F2F 7D: CPT

## 2021-02-11 RX ORDER — SIMVASTATIN 20 MG/1
20 TABLET, FILM COATED ORAL
Refills: 0 | Status: ACTIVE | COMMUNITY

## 2021-02-11 RX ORDER — CLONIDINE HYDROCHLORIDE 0.2 MG/1
0.2 TABLET ORAL
Refills: 0 | Status: ACTIVE | COMMUNITY

## 2021-02-11 RX ORDER — OXYBUTYNIN CHLORIDE 5 MG/1
5 TABLET ORAL
Refills: 0 | Status: ACTIVE | COMMUNITY

## 2021-02-11 RX ORDER — METOPROLOL TARTRATE 100 MG/1
100 TABLET, FILM COATED ORAL
Refills: 0 | Status: ACTIVE | COMMUNITY

## 2021-02-11 RX ORDER — OMEPRAZOLE 20 MG/1
20 CAPSULE, DELAYED RELEASE ORAL
Refills: 0 | Status: ACTIVE | COMMUNITY

## 2021-02-11 RX ORDER — FENOFIBRIC ACID 135 MG/1
135 CAPSULE, DELAYED RELEASE ORAL
Refills: 0 | Status: ACTIVE | COMMUNITY

## 2021-02-11 RX ORDER — HYDRALAZINE HYDROCHLORIDE 100 MG/1
100 TABLET ORAL
Refills: 0 | Status: ACTIVE | COMMUNITY

## 2021-02-11 RX ORDER — APIXABAN 5 MG/1
5 TABLET, FILM COATED ORAL
Refills: 0 | Status: ACTIVE | COMMUNITY

## 2021-02-11 RX ORDER — FUROSEMIDE 40 MG/1
40 TABLET ORAL
Refills: 0 | Status: ACTIVE | COMMUNITY

## 2021-02-11 RX ORDER — FLECAINIDE ACETATE 50 MG/1
50 TABLET ORAL
Refills: 0 | Status: ACTIVE | COMMUNITY

## 2021-02-11 NOTE — REASON FOR VISIT
[Follow-Up - From Hospitalization] : a follow-up visit after a recent hospitalization [Abnormal CXR/ Chest CT] : an abnormal CXR/ chest CT [Sleep Apnea] : sleep apnea [Pulmonary Embolism] : pulmonary embolism

## 2021-02-11 NOTE — HISTORY OF PRESENT ILLNESS
[TextBox_4] : The patient is an 85-year-old female who recently moved here from [place] who was hospitalized this outside hospital from January 29 through February 4. She presented with right flank pain. She was found to have bilateral pulmonary emboli with bilateral DVT and a right lower lobe cavitary lesion. Cultures and QuantiFERON were negative. 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 She was sent home on Eliquis. She denies current chest pain fevers chills night sweats cough or sputum.\par \par The patient is a lifelong nonsmoker. She has a history of obstructive sleep apnea and is on CPAP with supplemental oxygen at home. Her sleep studies were done in [place]. The patient has been significantly sedentary because of severe osteoarthritis in her knees. There is no prior history of blood clot or family history of clotting. She was negative for covid 19.

## 2021-02-11 NOTE — PHYSICAL EXAM
[No Acute Distress] : no acute distress [Normal Oropharynx] : normal oropharynx [Normal Appearance] : normal appearance [No Neck Mass] : no neck mass [Normal Rate/Rhythm] : normal rate/rhythm [Normal S1, S2] : normal s1, s2 [No Murmurs] : no murmurs [No Resp Distress] : no resp distress [No Abnormalities] : no abnormalities [Benign] : benign [Normal Gait] : normal gait [No Clubbing] : no clubbing [No Cyanosis] : no cyanosis [No Edema] : no edema [FROM] : FROM [1+ Pitting] : 1+ pitting [Normal Color/ Pigmentation] : normal color/ pigmentation [No Focal Deficits] : no focal deficits [Oriented x3] : oriented x3 [Normal Affect] : normal affect [TextBox_2] : obese [TextBox_68] : crackles at bases

## 2021-02-11 NOTE — ASSESSMENT
[FreeTextEntry1] : Patient with probable necrotic pulmonary infarct right base. There is no evidence that this is infectious. She is a lifelong nonsmoker and is a low risk for cancer. A followup CT scan will be done in about 2 months. Pulmonary emboli will be treated with Eliquis for at least 3 months. A repeat lab duplex will be done at the end of 3 months. There is no evidence for hypercoagulable state. This was likely provoked by sedentary lifestyle.\par \par The patient has obstructive sleep apnea diagnosed in [place] on CPAP with supplemental oxygen. She needs to get the oxygen renewed. I have asked her to obtain records from [place] regarding her sleep studies so that I can determine what she may need. Additionally, we will get a nocturnal pulse oximetry on CPAP off oxygen to determine if abdominal oxygen as needed.\par \par Followup will be in 3 months I gave her the name of an orthopedist who can address her knees.

## 2021-04-08 NOTE — PHYSICAL THERAPY INITIAL EVALUATION ADULT - CRITERIA FOR SKILLED THERAPEUTIC INTERVENTIONS
yes
impairments found/functional limitations in following categories/rehab potential/therapy frequency/predicted duration of therapy intervention/anticipated discharge recommendation

## 2021-04-14 NOTE — PATIENT DISCUSSION
Discussed Restasis or punctal occlusion. Body Location Override (Optional - Billing Will Still Be Based On Selected Body Map Location If Applicable): left nasal dorsum Detail Level: Detailed Depth Of Biopsy: dermis Was A Bandage Applied: Yes Size Of Lesion In Cm: 0.5 X Size Of Lesion In Cm: 0 Biopsy Type: H and E Biopsy Method: Dermablade Anesthesia Type: 1% lidocaine with epinephrine Hemostasis: Electrocautery Wound Care: Petrolatum Dressing: bandage Destruction After The Procedure: No Type Of Destruction Used: Curettage Curettage Text: The wound bed was treated with curettage after the biopsy was performed. Cryotherapy Text: The wound bed was treated with cryotherapy after the biopsy was performed. Electrodesiccation Text: The wound bed was treated with electrodesiccation after the biopsy was performed. Electrodesiccation And Curettage Text: The wound bed was treated with electrodesiccation and curettage after the biopsy was performed. Silver Nitrate Text: The wound bed was treated with silver nitrate after the biopsy was performed. Lab: -[id number] Path Notes (To The Dermatopathologist): Size: 0.5cm R/O: BCC vs SCC Consent: Written consent was obtained and risks were reviewed including but not limited to scarring, infection, bleeding, scabbing, incomplete removal, nerve damage and allergy to anesthesia. Post-Care Instructions: I reviewed with the patient in detail post-care instructions. Patient is to keep the biopsy site dry overnight, and then apply bacitracin twice daily until healed. Patient may apply hydrogen peroxide soaks to remove any crusting. Notification Instructions: Patient will be notified of biopsy results. However, patient instructed to call the office if not contacted within 2 weeks. Billing Type: [employer] Information: Selecting Yes will display possible errors in your note based on the variables you have selected. This validation is only offered as a suggestion for you. PLEASE NOTE THAT THE VALIDATION TEXT WILL BE REMOVED WHEN YOU FINALIZE YOUR NOTE. IF YOU WANT TO FAX A PRELIMINARY NOTE YOU WILL NEED TO TOGGLE THIS TO 'NO' IF YOU DO NOT WANT IT IN YOUR FAXED NOTE.

## 2021-04-17 ASSESSMENT — VISUAL ACUITY
OS_SC: 20/30-2
OD_CC: J1+
OS_CC: J1+
OD_SC: 20/30+
OD_SC: 20/50+
OS_SC: 20/25-
OS_PH: 20/30
OD_PH: 20/40

## 2021-04-17 ASSESSMENT — TONOMETRY
OD_IOP_MMHG: 16
OS_IOP_MMHG: 17
OS_IOP_MMHG: 18
OD_IOP_MMHG: 17

## 2021-04-30 ENCOUNTER — RESULT REVIEW (OUTPATIENT)
Age: 86
End: 2021-04-30

## 2021-04-30 ENCOUNTER — APPOINTMENT (OUTPATIENT)
Dept: CT IMAGING | Facility: CLINIC | Age: 86
End: 2021-04-30
Payer: MEDICARE

## 2021-04-30 ENCOUNTER — OUTPATIENT (OUTPATIENT)
Dept: OUTPATIENT SERVICES | Facility: HOSPITAL | Age: 86
LOS: 1 days | End: 2021-04-30
Payer: MEDICARE

## 2021-04-30 DIAGNOSIS — Z98.89 OTHER SPECIFIED POSTPROCEDURAL STATES: Chronic | ICD-10-CM

## 2021-04-30 DIAGNOSIS — J98.4 OTHER DISORDERS OF LUNG: ICD-10-CM

## 2021-04-30 PROCEDURE — 71250 CT THORAX DX C-: CPT

## 2021-04-30 PROCEDURE — 71250 CT THORAX DX C-: CPT | Mod: 26,MH

## 2021-05-17 ENCOUNTER — APPOINTMENT (OUTPATIENT)
Dept: ORTHOPEDIC SURGERY | Facility: CLINIC | Age: 86
End: 2021-05-17
Payer: MEDICARE

## 2021-05-17 VITALS
BODY MASS INDEX: 38.87 KG/M2 | DIASTOLIC BLOOD PRESSURE: 73 MMHG | TEMPERATURE: 98.1 F | HEIGHT: 60 IN | HEART RATE: 71 BPM | SYSTOLIC BLOOD PRESSURE: 137 MMHG | WEIGHT: 198 LBS

## 2021-05-17 DIAGNOSIS — M25.561 PAIN IN RIGHT KNEE: ICD-10-CM

## 2021-05-17 DIAGNOSIS — M25.562 PAIN IN RIGHT KNEE: ICD-10-CM

## 2021-05-17 PROCEDURE — 20610 DRAIN/INJ JOINT/BURSA W/O US: CPT | Mod: 50

## 2021-05-17 PROCEDURE — 99204 OFFICE O/P NEW MOD 45 MIN: CPT

## 2021-05-17 PROCEDURE — 73562 X-RAY EXAM OF KNEE 3: CPT | Mod: RT

## 2021-05-17 RX ORDER — CIPROFLOXACIN HYDROCHLORIDE 500 MG/1
500 TABLET, FILM COATED ORAL
Qty: 14 | Refills: 0 | Status: ACTIVE | COMMUNITY
Start: 2020-12-09

## 2021-05-17 RX ORDER — BACILLUS COAGULANS 1B CELL
CAPSULE ORAL
Qty: 20 | Refills: 0 | Status: ACTIVE | COMMUNITY
Start: 2021-04-21

## 2021-05-17 RX ORDER — APIXABAN 5 MG (74)
5 KIT ORAL
Qty: 74 | Refills: 0 | Status: ACTIVE | COMMUNITY
Start: 2021-02-04

## 2021-05-17 RX ORDER — DONEPEZIL HYDROCHLORIDE 5 MG/1
5 TABLET ORAL
Qty: 90 | Refills: 0 | Status: ACTIVE | COMMUNITY
Start: 2021-05-06

## 2021-05-17 RX ORDER — FERROUS SULFATE TAB 325 MG (65 MG ELEMENTAL FE) 325 (65 FE) MG
325 (65 FE) TAB ORAL
Qty: 90 | Refills: 0 | Status: ACTIVE | COMMUNITY
Start: 2021-05-05

## 2021-05-17 RX ORDER — TRIAMCINOLONE ACETONIDE 5 MG/G
0.5 CREAM TOPICAL
Qty: 30 | Refills: 0 | Status: ACTIVE | COMMUNITY
Start: 2021-05-06

## 2021-05-17 RX ORDER — OXYBUTYNIN CHLORIDE 5 MG/1
5 TABLET, EXTENDED RELEASE ORAL
Qty: 90 | Refills: 0 | Status: ACTIVE | COMMUNITY
Start: 2021-04-20

## 2021-05-17 RX ORDER — METOPROLOL SUCCINATE 100 MG/1
100 TABLET, EXTENDED RELEASE ORAL
Qty: 90 | Refills: 0 | Status: ACTIVE | COMMUNITY
Start: 2021-05-04

## 2021-05-17 RX ORDER — CLINDAMYCIN HYDROCHLORIDE 300 MG/1
300 CAPSULE ORAL
Qty: 60 | Refills: 0 | Status: ACTIVE | COMMUNITY
Start: 2021-04-21

## 2021-05-17 NOTE — PROCEDURE
[de-identified] : I injected the patient's b/l knee today with cortisone.\par \par I discussed at length with the patient the planned steroid and lidocaine injection. The risks, benefits, convalescence and alternatives were reviewed. The possible side effects discussed included but were not limited to: pain, swelling, heat, bleeding, and redness. Symptoms are generally mild but if they are extensive then contact the office. Giving pain relievers by mouth such as NSAIDs or Tylenol can generally treat the reactions to steroid and lidocaine. Rare cases of infection have been noted. Rash, hives and itching may occur post injection. If you have muscle pain or cramps, flushing and or swelling of the face, rapid heart beat, nausea, dizziness, fever, chills, headache, difficulty breathing, swelling in the arms or legs, or have a prickly feeling of your skin, contact a health care provider immediately. Following this discussion, the knee was prepped with Alcohol and under sterile condition the 80 mg Depo-Medrol and 6 cc Lidocaine injection was performed with a 20 gauge needle through a superolateral injection site. The needle was introduced into the joint, aspiration was performed to ensure intra-articular placement and the medication was injected. Upon withdrawal of the needle the site was cleaned with alcohol and a band aid applied. The patient tolerated the injection well and there were no adverse effects. Post injection instructions included no strenuous activity for 24 hours, cryotherapy and if there are any adverse effects to contact the office.

## 2021-05-17 NOTE — REASON FOR VISIT
[Initial Visit] : an initial visit for [Knee Pain] : knee pain [Spouse] : spouse [FreeTextEntry2] : Bilateral knee pain

## 2021-05-17 NOTE — HISTORY OF PRESENT ILLNESS
[Pain Location] : pain [7] : a current pain level of 7/10 [3] : a minimum pain level of 3/10 [8] : a maximum pain level of 8/10 [Bending] : worsened by bending [Walking] : worsened by walking [Rest] : relieved by rest [de-identified] : 86 y/o F presents with b/l knee pain where the right knee pain is worse than the left knee pain. She had cortisone injections in [place] one year ago which provided relief for a couple of weeks. Her pain is in the medial aspect of her knees. The pt has a stomach abscess and was given antibiotics which caused her to get a rash.

## 2021-05-17 NOTE — DISCUSSION/SUMMARY
[Surgical risks reviewed] : Surgical risks reviewed [de-identified] : 84 y/o F with bone on bone medial compartmental osteoarthritis of the bilateral knees. Conservative therapy and surgical options discussed in detail with the patient. She is a candidate for a staged bilateral TKA. We discussed pre-op, surgery, and post-op in detail. The pt is not interested in pursuing surgery at this time and would like to receive injections for pain management. Pt opted to receive a cortisone injection in the b/l knee today and tolerated it well. Additionally, we ordered gel injections for her bilateral knees. F/u when the gel injections are in the office. \par \par \par The patient is a 85 year individual with end stage arthritis of their b/l knee joint. Based upon the patient's continued symptoms and failure to respond to conservative treatment I have recommended a staged b/l total knee arthroplasty for this patient. A long discussion took place with the patient describing what a total joint replacement is and what the procedure would entail. A total knee arthroplasty model, similar to the implant that was used during the operation, was utilized to demonstrate and to discuss the various bearing surfaces of the implants. The hospitalization and post-operative care and rehabilitation were also discussed. The use of perioperative antibiotics and DVT prophylaxis were discussed. The risk, benefits and alternatives to a surgical intervention were discussed at length with the patient. The patient was also advised of risks related to the medical comorbidities, elevated body mass index (BMI), and smoking where applicable. We discussed how to reduce modifiable risk factors and encouraged smoking cessation were applicable.. A lengthy discussion took place to review the most common complications including but not limited to: deep vein thrombosis, pulmonary embolus, heart attack, stroke, infection, wound breakdown, numbness, damage to nerves, tendon, muscles, arteries or other blood vessels, death and other possible complications from anesthesia. The patient was told that we will take steps to minimize these risks by using sterile technique, antibiotics and DVT prophylaxis when appropriate and follow the patient postoperatively in the office setting to monitor progress. The possibility of recurrent pain, no improvement in pain and actual worsening of pain were also discussed with the patient.\par The discharge plan of care focused on the patient going home following surgery. The patient was encouraged to make the necessary arrangements to have someone stay with them when they are discharged home. Following discharge, a home care nurse was to the patient. The home care nurse would open the patient’s home care case and request home physical therapy services. Home physical therapy was to commence following discharge provided it was appropriate and covered by the health insurance benefit plan. \par The benefits of surgery were discussed with the patient including the potential for improving her current clinical condition through operative intervention. Alternatives to surgical intervention including continued conservative management were also discussed in detail. All questions were answered to the satisfaction of the patient. The treatment plan of care, as well as a model of a total knee arthroplasty equivalent to the one that will be used for their total joint replacement, was shared with the patient. The patient agreed to the plan of care as well as the use of implants in their total joint replacement.

## 2021-05-17 NOTE — END OF VISIT
[FreeTextEntry3] : I, Immanuel Ortiz, acted solely as a scribe for Dr. Kendell Johnson on this date 05/17/2021.

## 2021-05-17 NOTE — PHYSICAL EXAM
[LE] : Sensory: Intact in bilateral lower extremities [ALL] : dorsalis pedis, posterior tibial, femoral, popliteal, and radial 2+ and symmetric bilaterally [Normal] : Alert and in no acute distress [Walker] : ambulates with walker [Poor Appearance] : well-appearing [de-identified] : GENERAL APPEARANCE: Well nourished and hydrated, pleasant, alert, and oriented x 3. Appears their stated age. \par HEENT: Normocephalic, extraocular eye motion intact. Nasal septum midline. Oral cavity clear. External auditory canal clear. \par RESPIRATORY: Breath sounds clear and audible in all lobes. No wheezing, No accessory muscle use.\par CARDIOVASCULAR: No apparent abnormalities. No lower leg edema. No varicosities. Pedal pulses are palpable.\par NEUROLOGIC: Sensation is normal, no muscle weakness in the upper or lower extremities.\par DERMATOLOGIC: No apparent skin lesions, moist, warm, no rash.\par SPINE: Cervical spine appears normal and moves freely; thoracic spine appears normal and moves freely; lumbosacral spine appears normal and moves freely, normal, nontender.\par MUSCULOSKELETAL: Hands, wrists, and elbows are normal and move freely, shoulders are normal and move freely.  [de-identified] : Bilateral knee exam shows medial joint line tenderness, no effusion [de-identified] : 5V xray of the b/l knee done in the office today and reviewed by Dr. Kendell Johnson demonstrates bone on bone medial compartmental osteoarthritis

## 2021-05-20 ENCOUNTER — APPOINTMENT (OUTPATIENT)
Dept: PULMONOLOGY | Facility: CLINIC | Age: 86
End: 2021-05-20
Payer: MEDICARE

## 2021-05-20 VITALS
OXYGEN SATURATION: 96 % | DIASTOLIC BLOOD PRESSURE: 70 MMHG | RESPIRATION RATE: 16 BRPM | SYSTOLIC BLOOD PRESSURE: 130 MMHG | HEART RATE: 70 BPM

## 2021-05-20 VITALS — BODY MASS INDEX: 38.67 KG/M2 | WEIGHT: 198 LBS

## 2021-05-20 DIAGNOSIS — Z99.89 OBSTRUCTIVE SLEEP APNEA (ADULT) (PEDIATRIC): ICD-10-CM

## 2021-05-20 DIAGNOSIS — J98.4 OTHER DISORDERS OF LUNG: ICD-10-CM

## 2021-05-20 DIAGNOSIS — G47.33 OBSTRUCTIVE SLEEP APNEA (ADULT) (PEDIATRIC): ICD-10-CM

## 2021-05-20 PROCEDURE — 99214 OFFICE O/P EST MOD 30 MIN: CPT

## 2021-05-20 NOTE — ASSESSMENT
[FreeTextEntry1] : Patient with a history of bilateral pulmonary emboli with presumptive right lower lobe cavitary lesion representing a necrotic infarct. That has resolved on CT scan. Patient is currently off anticoagulation. She is doing well.\par \par Sleep apnea needs to be readdressed. I was unable to get any records from [place]. In view of the weight loss however she needs a repeat sleep study. A split night study has been ordered so that I can adjust her CPAP machine accordingly and possibly fitt a new mask for her. Follow up after the sleep study.

## 2021-05-20 NOTE — HISTORY OF PRESENT ILLNESS
[TextBox_4] : The patient had a repeat CT scan performed. I reviewed that with her today.  According to the patient's , she was diagnosed with obstructive sleep apnea about 3 years ago in [place]. She's been on CPAP ever since. She has lost about 60 pounds. She is having some difficulty with mask leak currently.\par \par Patient was taken off Eliquis presumably by her cardiologist. She is getting injections in her knees and is contemplating knee replacements.

## 2021-05-20 NOTE — CONSULT LETTER
[Dear  ___] : Dear  [unfilled], [Courtesy Letter:] : I had the pleasure of seeing your patient, [unfilled], in my office today. [Please see my note below.] : Please see my note below. [Consult Closing:] : Thank you very much for allowing me to participate in the care of this patient.  If you have any questions, please do not hesitate to contact me. [Sincerely,] : Sincerely, [FreeTextEntry3] : Yanelis Mayo MD FCCP\par D-ABSM\par ABIM board certified in  Pulmonary diseases, Sleep medicine\par Internal medicine\par

## 2021-05-20 NOTE — REASON FOR VISIT
[Follow-Up] : a follow-up visit [Abnormal CXR/ Chest CT] : an abnormal CXR/ chest CT [Sleep Apnea] : sleep apnea [Pulmonary Embolism] : pulmonary embolism [Spouse] : spouse

## 2021-05-24 DIAGNOSIS — Z01.818 ENCOUNTER FOR OTHER PREPROCEDURAL EXAMINATION: ICD-10-CM

## 2021-05-24 DIAGNOSIS — I26.99 OTHER PULMONARY EMBOLISM W/OUT ACUTE COR PULMONALE: ICD-10-CM

## 2021-05-25 ENCOUNTER — APPOINTMENT (OUTPATIENT)
Dept: DISASTER EMERGENCY | Facility: CLINIC | Age: 86
End: 2021-05-25

## 2021-05-25 ENCOUNTER — RESULT REVIEW (OUTPATIENT)
Age: 86
End: 2021-05-25

## 2021-06-03 ENCOUNTER — APPOINTMENT (OUTPATIENT)
Dept: DERMATOLOGY | Facility: CLINIC | Age: 86
End: 2021-06-03
Payer: MEDICARE

## 2021-06-03 PROCEDURE — 99202 OFFICE O/P NEW SF 15 MIN: CPT

## 2021-06-09 ENCOUNTER — APPOINTMENT (OUTPATIENT)
Dept: ULTRASOUND IMAGING | Facility: CLINIC | Age: 86
End: 2021-06-09
Payer: MEDICARE

## 2021-06-09 PROCEDURE — 93970 EXTREMITY STUDY: CPT | Mod: 26

## 2021-06-13 ENCOUNTER — EMERGENCY (EMERGENCY)
Facility: HOSPITAL | Age: 86
LOS: 1 days | Discharge: HOME | End: 2021-06-13
Attending: EMERGENCY MEDICINE
Payer: MEDICARE

## 2021-06-13 VITALS
WEIGHT: 197.53 LBS | DIASTOLIC BLOOD PRESSURE: 55 MMHG | HEIGHT: 60 IN | OXYGEN SATURATION: 94 % | HEART RATE: 69 BPM | RESPIRATION RATE: 18 BRPM | SYSTOLIC BLOOD PRESSURE: 103 MMHG | TEMPERATURE: 98 F

## 2021-06-13 DIAGNOSIS — Z98.89 OTHER SPECIFIED POSTPROCEDURAL STATES: Chronic | ICD-10-CM

## 2021-06-13 LAB
ALBUMIN SERPL ELPH-MCNC: 3.2 G/DL — LOW (ref 3.3–5.2)
ALP SERPL-CCNC: 57 U/L — SIGNIFICANT CHANGE UP (ref 40–120)
ALT FLD-CCNC: 33 U/L — HIGH
ANION GAP SERPL CALC-SCNC: 12 MMOL/L — SIGNIFICANT CHANGE UP (ref 5–17)
ANION GAP SERPL CALC-SCNC: 12 MMOL/L — SIGNIFICANT CHANGE UP (ref 5–17)
APPEARANCE UR: ABNORMAL
APTT BLD: 47 SEC — HIGH (ref 27.5–35.5)
AST SERPL-CCNC: 40 U/L — HIGH
BACTERIA # UR AUTO: ABNORMAL
BASOPHILS # BLD AUTO: 0.02 K/UL — SIGNIFICANT CHANGE UP (ref 0–0.2)
BASOPHILS NFR BLD AUTO: 0.3 % — SIGNIFICANT CHANGE UP (ref 0–2)
BILIRUB SERPL-MCNC: 0.4 MG/DL — SIGNIFICANT CHANGE UP (ref 0.4–2)
BILIRUB UR-MCNC: NEGATIVE — SIGNIFICANT CHANGE UP
BUN SERPL-MCNC: 33.5 MG/DL — HIGH (ref 8–20)
BUN SERPL-MCNC: 37.5 MG/DL — HIGH (ref 8–20)
CALCIUM SERPL-MCNC: 9 MG/DL — SIGNIFICANT CHANGE UP (ref 8.6–10.2)
CALCIUM SERPL-MCNC: 9 MG/DL — SIGNIFICANT CHANGE UP (ref 8.6–10.2)
CHLORIDE SERPL-SCNC: 93 MMOL/L — LOW (ref 98–107)
CHLORIDE SERPL-SCNC: 95 MMOL/L — LOW (ref 98–107)
CO2 SERPL-SCNC: 29 MMOL/L — SIGNIFICANT CHANGE UP (ref 22–29)
CO2 SERPL-SCNC: 31 MMOL/L — HIGH (ref 22–29)
COLOR SPEC: YELLOW — SIGNIFICANT CHANGE UP
CREAT SERPL-MCNC: 1.12 MG/DL — SIGNIFICANT CHANGE UP (ref 0.5–1.3)
CREAT SERPL-MCNC: 1.46 MG/DL — HIGH (ref 0.5–1.3)
DIFF PNL FLD: NEGATIVE — SIGNIFICANT CHANGE UP
EOSINOPHIL # BLD AUTO: 0.12 K/UL — SIGNIFICANT CHANGE UP (ref 0–0.5)
EOSINOPHIL NFR BLD AUTO: 1.6 % — SIGNIFICANT CHANGE UP (ref 0–6)
EPI CELLS # UR: SIGNIFICANT CHANGE UP
GLUCOSE SERPL-MCNC: 101 MG/DL — HIGH (ref 70–99)
GLUCOSE SERPL-MCNC: 154 MG/DL — HIGH (ref 70–99)
GLUCOSE UR QL: NEGATIVE — SIGNIFICANT CHANGE UP
HCT VFR BLD CALC: 29.8 % — LOW (ref 34.5–45)
HCT VFR BLD CALC: 31.4 % — LOW (ref 34.5–45)
HGB BLD-MCNC: 10.3 G/DL — LOW (ref 11.5–15.5)
HGB BLD-MCNC: 9.9 G/DL — LOW (ref 11.5–15.5)
IMM GRANULOCYTES NFR BLD AUTO: 0.5 % — SIGNIFICANT CHANGE UP (ref 0–1.5)
INR BLD: 1.28 RATIO — HIGH (ref 0.88–1.16)
KETONES UR-MCNC: NEGATIVE — SIGNIFICANT CHANGE UP
LEUKOCYTE ESTERASE UR-ACNC: ABNORMAL
LYMPHOCYTES # BLD AUTO: 0.89 K/UL — LOW (ref 1–3.3)
LYMPHOCYTES # BLD AUTO: 11.7 % — LOW (ref 13–44)
MCHC RBC-ENTMCNC: 30.2 PG — SIGNIFICANT CHANGE UP (ref 27–34)
MCHC RBC-ENTMCNC: 30.3 PG — SIGNIFICANT CHANGE UP (ref 27–34)
MCHC RBC-ENTMCNC: 32.8 GM/DL — SIGNIFICANT CHANGE UP (ref 32–36)
MCHC RBC-ENTMCNC: 33.2 GM/DL — SIGNIFICANT CHANGE UP (ref 32–36)
MCV RBC AUTO: 91.1 FL — SIGNIFICANT CHANGE UP (ref 80–100)
MCV RBC AUTO: 92.1 FL — SIGNIFICANT CHANGE UP (ref 80–100)
MONOCYTES # BLD AUTO: 0.48 K/UL — SIGNIFICANT CHANGE UP (ref 0–0.9)
MONOCYTES NFR BLD AUTO: 6.3 % — SIGNIFICANT CHANGE UP (ref 2–14)
NEUTROPHILS # BLD AUTO: 6.04 K/UL — SIGNIFICANT CHANGE UP (ref 1.8–7.4)
NEUTROPHILS NFR BLD AUTO: 79.6 % — HIGH (ref 43–77)
NITRITE UR-MCNC: POSITIVE
NT-PROBNP SERPL-SCNC: 531 PG/ML — HIGH (ref 0–300)
OB PNL STL: NEGATIVE — SIGNIFICANT CHANGE UP
PH UR: 6 — SIGNIFICANT CHANGE UP (ref 5–8)
PLATELET # BLD AUTO: 229 K/UL — SIGNIFICANT CHANGE UP (ref 150–400)
PLATELET # BLD AUTO: 273 K/UL — SIGNIFICANT CHANGE UP (ref 150–400)
POTASSIUM SERPL-MCNC: 3 MMOL/L — LOW (ref 3.5–5.3)
POTASSIUM SERPL-MCNC: 3.2 MMOL/L — LOW (ref 3.5–5.3)
POTASSIUM SERPL-SCNC: 3 MMOL/L — LOW (ref 3.5–5.3)
POTASSIUM SERPL-SCNC: 3.2 MMOL/L — LOW (ref 3.5–5.3)
PROT SERPL-MCNC: 6.6 G/DL — SIGNIFICANT CHANGE UP (ref 6.6–8.7)
PROT UR-MCNC: 15
PROTHROM AB SERPL-ACNC: 14.7 SEC — HIGH (ref 10.6–13.6)
RBC # BLD: 3.27 M/UL — LOW (ref 3.8–5.2)
RBC # BLD: 3.41 M/UL — LOW (ref 3.8–5.2)
RBC # FLD: 13.7 % — SIGNIFICANT CHANGE UP (ref 10.3–14.5)
RBC # FLD: 13.7 % — SIGNIFICANT CHANGE UP (ref 10.3–14.5)
RBC CASTS # UR COMP ASSIST: SIGNIFICANT CHANGE UP /HPF (ref 0–4)
SARS-COV-2 RNA SPEC QL NAA+PROBE: SIGNIFICANT CHANGE UP
SODIUM SERPL-SCNC: 136 MMOL/L — SIGNIFICANT CHANGE UP (ref 135–145)
SODIUM SERPL-SCNC: 136 MMOL/L — SIGNIFICANT CHANGE UP (ref 135–145)
SP GR SPEC: 1.01 — SIGNIFICANT CHANGE UP (ref 1.01–1.02)
TROPONIN T SERPL-MCNC: <0.01 NG/ML — SIGNIFICANT CHANGE UP (ref 0–0.06)
TROPONIN T SERPL-MCNC: <0.01 NG/ML — SIGNIFICANT CHANGE UP (ref 0–0.06)
UROBILINOGEN FLD QL: NEGATIVE — SIGNIFICANT CHANGE UP
WBC # BLD: 7.14 K/UL — SIGNIFICANT CHANGE UP (ref 3.8–10.5)
WBC # BLD: 7.59 K/UL — SIGNIFICANT CHANGE UP (ref 3.8–10.5)
WBC # FLD AUTO: 7.14 K/UL — SIGNIFICANT CHANGE UP (ref 3.8–10.5)
WBC # FLD AUTO: 7.59 K/UL — SIGNIFICANT CHANGE UP (ref 3.8–10.5)
WBC UR QL: ABNORMAL

## 2021-06-13 PROCEDURE — 0042T: CPT

## 2021-06-13 PROCEDURE — 70496 CT ANGIOGRAPHY HEAD: CPT | Mod: 26,MA

## 2021-06-13 PROCEDURE — 70498 CT ANGIOGRAPHY NECK: CPT | Mod: 26,MA

## 2021-06-13 PROCEDURE — 99291 CRITICAL CARE FIRST HOUR: CPT

## 2021-06-13 PROCEDURE — 71045 X-RAY EXAM CHEST 1 VIEW: CPT | Mod: 26

## 2021-06-13 PROCEDURE — 93010 ELECTROCARDIOGRAM REPORT: CPT

## 2021-06-13 RX ORDER — OXYBUTYNIN CHLORIDE 5 MG
5 TABLET ORAL DAILY
Refills: 0 | Status: DISCONTINUED | OUTPATIENT
Start: 2021-06-13 | End: 2021-06-18

## 2021-06-13 RX ORDER — DONEPEZIL HYDROCHLORIDE 10 MG/1
5 TABLET, FILM COATED ORAL AT BEDTIME
Refills: 0 | Status: DISCONTINUED | OUTPATIENT
Start: 2021-06-13 | End: 2021-06-18

## 2021-06-13 RX ORDER — FLECAINIDE ACETATE 50 MG
50 TABLET ORAL
Refills: 0 | Status: DISCONTINUED | OUTPATIENT
Start: 2021-06-13 | End: 2021-06-18

## 2021-06-13 RX ORDER — MAGNESIUM SULFATE 500 MG/ML
2 VIAL (ML) INJECTION ONCE
Refills: 0 | Status: COMPLETED | OUTPATIENT
Start: 2021-06-13 | End: 2021-06-13

## 2021-06-13 RX ORDER — GABAPENTIN 400 MG/1
300 CAPSULE ORAL THREE TIMES A DAY
Refills: 0 | Status: DISCONTINUED | OUTPATIENT
Start: 2021-06-13 | End: 2021-06-18

## 2021-06-13 RX ORDER — APIXABAN 2.5 MG/1
5 TABLET, FILM COATED ORAL
Refills: 0 | Status: DISCONTINUED | OUTPATIENT
Start: 2021-06-13 | End: 2021-06-18

## 2021-06-13 RX ORDER — POTASSIUM CHLORIDE 20 MEQ
40 PACKET (EA) ORAL DAILY
Refills: 0 | Status: DISCONTINUED | OUTPATIENT
Start: 2021-06-13 | End: 2021-06-18

## 2021-06-13 RX ORDER — FUROSEMIDE 40 MG
40 TABLET ORAL DAILY
Refills: 0 | Status: DISCONTINUED | OUTPATIENT
Start: 2021-06-13 | End: 2021-06-18

## 2021-06-13 RX ORDER — METOPROLOL TARTRATE 50 MG
100 TABLET ORAL DAILY
Refills: 0 | Status: DISCONTINUED | OUTPATIENT
Start: 2021-06-13 | End: 2021-06-18

## 2021-06-13 RX ORDER — PANTOPRAZOLE SODIUM 20 MG/1
40 TABLET, DELAYED RELEASE ORAL
Refills: 0 | Status: DISCONTINUED | OUTPATIENT
Start: 2021-06-13 | End: 2021-06-18

## 2021-06-13 RX ORDER — CEFTRIAXONE 500 MG/1
INJECTION, POWDER, FOR SOLUTION INTRAMUSCULAR; INTRAVENOUS
Refills: 0 | Status: COMPLETED | OUTPATIENT
Start: 2021-06-13 | End: 2021-06-20

## 2021-06-13 RX ORDER — CEFTRIAXONE 500 MG/1
1000 INJECTION, POWDER, FOR SOLUTION INTRAMUSCULAR; INTRAVENOUS ONCE
Refills: 0 | Status: COMPLETED | OUTPATIENT
Start: 2021-06-13 | End: 2021-06-13

## 2021-06-13 RX ORDER — SIMVASTATIN 20 MG/1
20 TABLET, FILM COATED ORAL AT BEDTIME
Refills: 0 | Status: DISCONTINUED | OUTPATIENT
Start: 2021-06-13 | End: 2021-06-18

## 2021-06-13 RX ORDER — POTASSIUM CHLORIDE 20 MEQ
10 PACKET (EA) ORAL
Refills: 0 | Status: COMPLETED | OUTPATIENT
Start: 2021-06-13 | End: 2021-06-13

## 2021-06-13 RX ADMIN — Medication 100 MILLIEQUIVALENT(S): at 19:20

## 2021-06-13 RX ADMIN — SIMVASTATIN 20 MILLIGRAM(S): 20 TABLET, FILM COATED ORAL at 22:14

## 2021-06-13 RX ADMIN — Medication 40 MILLIEQUIVALENT(S): at 23:08

## 2021-06-13 RX ADMIN — Medication 100 MILLIEQUIVALENT(S): at 19:00

## 2021-06-13 RX ADMIN — Medication 100 MILLIEQUIVALENT(S): at 17:00

## 2021-06-13 RX ADMIN — GABAPENTIN 300 MILLIGRAM(S): 400 CAPSULE ORAL at 21:31

## 2021-06-13 RX ADMIN — CEFTRIAXONE 100 MILLIGRAM(S): 500 INJECTION, POWDER, FOR SOLUTION INTRAMUSCULAR; INTRAVENOUS at 19:30

## 2021-06-13 RX ADMIN — DONEPEZIL HYDROCHLORIDE 5 MILLIGRAM(S): 10 TABLET, FILM COATED ORAL at 21:31

## 2021-06-13 RX ADMIN — Medication 2 GRAM(S): at 17:11

## 2021-06-13 RX ADMIN — Medication 50 MILLIGRAM(S): at 21:31

## 2021-06-13 RX ADMIN — Medication 50 GRAM(S): at 15:52

## 2021-06-13 RX ADMIN — APIXABAN 5 MILLIGRAM(S): 2.5 TABLET, FILM COATED ORAL at 21:31

## 2021-06-13 NOTE — ED ADULT TRIAGE NOTE - CHIEF COMPLAINT QUOTE
pt awake and alert BIBA from home s/p syncopal episode. EMS reports family states pt is not @ baseline and altered x1.5hours. code stroke activated @ 1209, MD called for eval.

## 2021-06-13 NOTE — ED PROVIDER NOTE - ATTENDING CONTRIBUTION TO CARE
I performed the initial face to face bedside interview with this patient regarding history of present illness, review of symptoms and relevant past medical, social and family history.  I completed an independent physical examination.  I was the initial provider who evaluated this patient. I have signed out the follow up of any pending tests (i.e. labs, radiological studies) to the resident.  I have communicated the patient’s plan of care and disposition with the resident. I performed the initial face to face bedside interview with this patient regarding history of present illness, review of symptoms and relevant past medical, social and family history.  I completed an independent physical examination.  I was the initial provider who evaluated this patient. I have signed out the follow up of any pending tests (i.e. labs, radiological studies) to the resident.  I have communicated the patient’s plan of care and disposition with the resident.    I spent 35 minutes of critical care time with this patient. This does not include time spent on separately reported billable procedures.

## 2021-06-13 NOTE — ED PROVIDER NOTE - OBJECTIVE STATEMENT
Pt is an 84 y/o F w/PMHx hypertension, hyperlipidemia, dementia, GERD, diverticulitis presents c/o syncopal episode.  Pt was at home with family and lost consciousness.  Per family Pt did not return to baseline.  They called her PMD and were told to bring to ED for evaluation.  Pt last known well was 1115am.  Pt has no acute concerns, is A&Ox2 (self, location). Pt is an 85 year old F w/PMHx hypertension, hyperlipidemia, dementia, GERD, diverticulitis presents c/o syncopal episode.  Pt was at home with family and lost consciousness.  Per family Pt did not return to baseline.  They called her PMD and were told to bring to ED for evaluation.  Pt last known well was 1115am.  Pt has no acute concerns, is A&Ox2 (self, location). Pt is an 85 year old F PMHx hypertension, hyperlipidemia, dementia, GERD, diverticulitis presents c/o syncopal episode.  Pt was at home with family and lost consciousness.  Per family Pt did not return to baseline.  They called her PMD and were told to bring to ED for evaluation.  Pt last known well was 1115am.  Pt has no acute concerns, is A&Ox2 (self, location).

## 2021-06-13 NOTE — ED ADULT NURSE NOTE - OBJECTIVE STATEMENT
85 year old female, PMHx of HTN, HLD, diverticulitis, present to the ED from home s/p syncope. Per EMS family states that she is not at her baseline mental status. Pt is a poor historian. Patient A/Ox1, respirations are even and non labored, NSR on the monitor, abdomen is soft and non tender, NAD noted.

## 2021-06-13 NOTE — ED PROVIDER NOTE - CLINICAL SUMMARY MEDICAL DECISION MAKING FREE TEXT BOX
labs and diagnostic imaging results reviewed with patient and family labs and diagnostic imaging results reviewed with patient and family; will admit to obs for MRI and reassessment

## 2021-06-13 NOTE — ED ADULT TRIAGE NOTE - LAST KNOWN WELL DATE/TIME
13-Jun-2021 10:00 Elliptical Excision Additional Text (Leave Blank If You Do Not Want): The margin was drawn around the clinically apparent lesion.  An elliptical shape was then drawn on the skin incorporating the lesion and margins.  Incisions were then made along these lines to the appropriate tissue plane and the lesion was extirpated.

## 2021-06-13 NOTE — ED CDU PROVIDER INITIAL DAY NOTE - NS ED ROS FT
No fever/chills, No photophobia/eye pain/changes in vision, No ear pain/sore throat/dysphagia, No chest pain/palpitations, no SOB/cough/wheeze/stridor, No abdominal pain, No N/V/D, no dysuria/frequency/discharge, No neck/back pain, no rash, +syncope/LOC.

## 2021-06-13 NOTE — ED PROVIDER NOTE - CARE PLAN
Principal Discharge DX:	Syncope   Principal Discharge DX:	Hypokalemia  Secondary Diagnosis:	Hypomagnesemia

## 2021-06-13 NOTE — ED CDU PROVIDER INITIAL DAY NOTE - OBJECTIVE STATEMENT
This is an 85 year old F PMHx hypertension, hyperlipidemia, dementia, GERD, diverticulitis, PE (recently diagnosed in January) presents c/o syncopal episode.  Pt was at home with family and lost consciousness.  Per family Pt did not return to baseline.  They called her PMD and were told to bring to ED for evaluation.  Pt last known well was 1115am.  Pt has no acute concerns, is A&Ox2 (self, location).

## 2021-06-13 NOTE — ED ADULT NURSE NOTE - CHPI ED NUR SYMPTOMS NEG
no blurred vision/no change in level of consciousness/no confusion/no dizziness/no fever/no numbness/no vomiting

## 2021-06-13 NOTE — ED PROVIDER NOTE - WR ORDER DATE AND TIME 1
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  Referred to Dr. Johnson by Dr. Les Nevarez ([facility] [place] in [place]) for VFD OS.  Per records patient had ?VFD OS ~ 2018, worsened 7/2019, confirmed 1/28/20, no subjective changes noted per patient  No flashes, no trauma    PAST OCULAR SURGERY  None      RETINAL IMAGING:  OCT   OD -  Macula - retina normal,PHF attached   Periphery - schisis  OS -  Macula - SRF temporal macula, tr ERM, PVD   Periphery - schisis      ASSESSMENT & PLAN    1. RD OS associated with schisis   - lattice OS is in attached retina   - unclear if progressive   - observe closely   - recheck 1 month     - may need surgery if progresses   - would do PPV if progresses     - r/b/a d/w patient: vision loss, blindness, infection, bleeding   - retinal detachment, need for more surgeries, need for gas or oil bubble and bubble restrictions   - cataract, diplopia, refractive change   - persistent blurriness, distortion, or scotoma   - participation of fellow or resident        2. Schisis OU      3. Lattice OS   - advised S/Sx RD      4. Syneresis OD & PVD OS   - advised S/Sx RD 2/2020    5. OAG suspect   - CDR asymmetry   - mild irregularity on OCT-RNFL 2/2020          return to clinic: 1 month, OCT OU    ATTESTATION     Attending Physician Attestation:      Complete documentation of historical and exam elements from today's encounter can be found in the full encounter summary report (not reduplicated in this progress note).  I personally obtained the chief complaint(s) and history of present illness.  I confirmed and edited as necessary the review of systems, past medical/surgical history, family history, social history, and examination findings as documented by others; and I examined the patient myself.  I personally reviewed the relevant  tests, images, and reports as documented above.  I formulated and edited as necessary the assessment and plan and discussed the findings and management plan with the patient and family    Mary Puente MD, PhD  , Vitreoretinal Surgery  Department of Ophthalmology  [facility]       13-Jun-2021 16:31

## 2021-06-13 NOTE — ED ADULT NURSE REASSESSMENT NOTE - NS ED NURSE REASSESS COMMENT FT1
pt remains in no distress, no change in mental status since this RN initial assessment. pt remains NSR on monitor, family and pt aware of need for observation for MRI in AM. pt skin warm dry and intact, VSS, pt cleaned up and positioned for comfort. will monitor.

## 2021-06-13 NOTE — ED ADULT NURSE NOTE - NSFALLRSKINDICATORS_ED_ALL_ED
RN notified Dr. Ayala patient continues to report pain 7/10 of cramping after receiving fentanyl 125mcg and dilaudid 1.4mg IV. Warm pack present on patient's abdomen and back and patient given coffee as she reports this has helped with cramps at home in the past. Patient reports history of severe cramps that have brought her into the ER where they have given her IM injections that haven't helped as she returned to severe levels of pain by the time she returned home.   Per Dr. Ayala may give patient tylenol 1000mg po in one hour when it is due again. RN notified patient of plan to move to Phase II and patient agreeable to plan.  
no

## 2021-06-13 NOTE — ED ADULT NURSE REASSESSMENT NOTE - NS ED NURSE REASSESS COMMENT FT1
pt resting comfortably at this time, labs to be drawn as ordered. family at bedside. aware of plan of care. IV site patent, pt remains on primafit for elimination. no distress no complains, VSS.

## 2021-06-13 NOTE — ED PROVIDER NOTE - PHYSICAL EXAMINATION
General: well appearing, interactive, well nourished, NAD  HEENT: pupils equal and reactive, normal external ears bilaterally   Cardiac: RRR, no MRG appreciated  Resp: lungs clear to auscultation bilaterally, symmetric chest wall rise  Abd: soft, nontender, nondistended,   : no CVA tenderness  Neuro: Moving all extremities  Skin:  +rash on right neck General: well appearing, interactive, well nourished, NAD  HEENT: pupils equal and reactive, normal external ears bilaterally   Cardiac: RRR, no MRG appreciated  Resp: lungs clear to auscultation bilaterally, symmetric chest wall rise  Abd: soft, nontender, nondistended,   : no CVA tenderness  Neuro: Moving all extremities  Skin:  +rash on right neck.

## 2021-06-13 NOTE — ED PROVIDER NOTE - NS ED ROS FT
CONSTITUTIONAL: No fevers, no chills  Eyes: No vision changes  Cardiovascular: No Chest pain  Respiratory: No SOB  Gastrointestinal: No n/v/c/d, no abd pain  Genitourinary: no dysuria, no hematuria  SKIN: no rashes.  MSK: no weakness, no myalgias, no arthralgias  NEURO: no headache, no weakness, no numbness  PSYCHIATRIC: no SI/HI CONSTITUTIONAL: No fevers, no chills  Eyes: No vision changes  Cardiovascular: No Chest pain  Respiratory: No SOB  Gastrointestinal: No n/v/c/d, no abd pain  Genitourinary: no dysuria, no hematuria  SKIN: no rashes.  MSK: no weakness, no myalgias, no arthralgias  NEURO: no headache, no weakness, no numbness  PSYCHIATRIC: no SI/HI.

## 2021-06-13 NOTE — ED ADULT NURSE REASSESSMENT NOTE - NS ED NURSE REASSESS COMMENT FT1
Assumed care of the Pt at 2240. Verbal report received from PARAMJIT Dior. Pt is AOx2-3 can be forgetful Hx of dementia. Pt Denies any chest pain, shortness of breath, nausea or dizziness no neuro deficits noted at this time, Pt VSS, PIV patent. Pt with no complaints for RN at this time. Pt is NSR on CM as per monitor tech. Pt given call bell, call bell within reach, Pt instructed to use call bell when assistance is needed. Pt pending MRI. PT/SW consult. Plan of care explained, wait time explained, will continue to monitor. Assumed care of the Pt at 2240. Verbal report received from RN Barby. Pt is AOx2-3 can be forgetful Hx of dementia. Pt Denies any chest pain, shortness of breath, nausea or dizziness no neuro deficits noted at this time, Pt VSS, PIV patent. Pt noted to have two 1/2 cm stage 2 ulcers on left buttock OWEN Rogers made aware. Pt with no complaints for RN at this time. Pt is NSR on CM as per monitor tech. Pt given call bell, call bell within reach, Pt instructed to use call bell when assistance is needed. Pt pending MRI. PT/SW consult. Plan of care explained, wait time explained, will continue to monitor.

## 2021-06-13 NOTE — ED CDU PROVIDER INITIAL DAY NOTE - MEDICAL DECISION MAKING DETAILS
syncope, no acting right as per family, lives with , trying to feed and patient slumped and not as alert as per family, code stroke in ED, CTA stroke protocal negative, will check MRI, and rpt serial troponins and EKG, check UA and CXR, patient had echo done 1/2021 for recent diagnosis of PE on AC, trend H&H, guaic negative, PT/SW/CM eval in AM, family expresses concerns with assistance at home as lives with  who is also elderly.

## 2021-06-13 NOTE — ED CDU PROVIDER INITIAL DAY NOTE - ATTENDING CONTRIBUTION TO CARE
seen with acp  Code STroke with no findings on CT  med hx sig resolving shingles Alzheimer arrhythmia hld GERD etc  plan for obs is MR brain, treat uti, replace K, PT eval  agree with care plan

## 2021-06-14 PROCEDURE — 70551 MRI BRAIN STEM W/O DYE: CPT | Mod: 26,MA

## 2021-06-14 RX ORDER — CEFTRIAXONE 500 MG/1
1000 INJECTION, POWDER, FOR SOLUTION INTRAMUSCULAR; INTRAVENOUS EVERY 24 HOURS
Refills: 0 | Status: DISCONTINUED | OUTPATIENT
Start: 2021-06-14 | End: 2021-06-18

## 2021-06-14 RX ORDER — CEPHALEXIN 500 MG
1 CAPSULE ORAL
Qty: 14 | Refills: 0
Start: 2021-06-14 | End: 2021-06-20

## 2021-06-14 RX ADMIN — Medication 5 MILLIGRAM(S): at 12:04

## 2021-06-14 RX ADMIN — APIXABAN 5 MILLIGRAM(S): 2.5 TABLET, FILM COATED ORAL at 07:57

## 2021-06-14 RX ADMIN — Medication 50 MILLIGRAM(S): at 21:50

## 2021-06-14 RX ADMIN — APIXABAN 5 MILLIGRAM(S): 2.5 TABLET, FILM COATED ORAL at 18:11

## 2021-06-14 RX ADMIN — Medication 100 MILLIGRAM(S): at 05:58

## 2021-06-14 RX ADMIN — DONEPEZIL HYDROCHLORIDE 5 MILLIGRAM(S): 10 TABLET, FILM COATED ORAL at 23:50

## 2021-06-14 RX ADMIN — Medication 50 MILLIGRAM(S): at 07:58

## 2021-06-14 RX ADMIN — Medication 0.2 MILLIGRAM(S): at 18:11

## 2021-06-14 RX ADMIN — CEFTRIAXONE 100 MILLIGRAM(S): 500 INJECTION, POWDER, FOR SOLUTION INTRAMUSCULAR; INTRAVENOUS at 18:11

## 2021-06-14 RX ADMIN — PANTOPRAZOLE SODIUM 40 MILLIGRAM(S): 20 TABLET, DELAYED RELEASE ORAL at 07:57

## 2021-06-14 RX ADMIN — Medication 40 MILLIGRAM(S): at 05:58

## 2021-06-14 RX ADMIN — GABAPENTIN 300 MILLIGRAM(S): 400 CAPSULE ORAL at 05:58

## 2021-06-14 RX ADMIN — SIMVASTATIN 20 MILLIGRAM(S): 20 TABLET, FILM COATED ORAL at 23:50

## 2021-06-14 RX ADMIN — Medication 0.2 MILLIGRAM(S): at 05:58

## 2021-06-14 RX ADMIN — GABAPENTIN 300 MILLIGRAM(S): 400 CAPSULE ORAL at 23:50

## 2021-06-14 RX ADMIN — GABAPENTIN 300 MILLIGRAM(S): 400 CAPSULE ORAL at 14:00

## 2021-06-14 RX ADMIN — Medication 40 MILLIEQUIVALENT(S): at 12:04

## 2021-06-14 RX ADMIN — CEFTRIAXONE 1000 MILLIGRAM(S): 500 INJECTION, POWDER, FOR SOLUTION INTRAMUSCULAR; INTRAVENOUS at 18:39

## 2021-06-14 NOTE — ED ADULT NURSE REASSESSMENT NOTE - NS ED NURSE REASSESS COMMENT FT1
Pt with no neuro deficits noted, resting comfortably in bed VSS no complaints for RN at this time, will continue to monitor.

## 2021-06-14 NOTE — PHYSICAL THERAPY INITIAL EVALUATION ADULT - GAIT PATTERN USED, PT EVAL
decreased upright posture, decreased gait velocity and activity tolerance, contact assist for safety due to fatigue

## 2021-06-14 NOTE — ED ADULT NURSE REASSESSMENT NOTE - NS ED NURSE REASSESS COMMENT FT1
Pt awake and alert x2, forgetful. Pt assisted with meals. Pt on Primafit draining yellow urine. Turned and positioned q2 hours. Safety maintained. Will continue to monitor.

## 2021-06-14 NOTE — PHYSICAL THERAPY INITIAL EVALUATION ADULT - ADDITIONAL COMMENTS
pt a poor historian as per previous PT hermes: owns and uses a RW, has home O2 for night, no stairs to negotiate, receives assist prn in shower and ADLs

## 2021-06-14 NOTE — ED ADULT NURSE REASSESSMENT NOTE - NS ED NURSE REASSESS COMMENT FT1
Assumed care of the Pt at 1930, Verbal report received from PARAMJIT Peraza. Pt is AOx2-3 can be forgetful. Pt Denies any chest pain, shortness of breath, nausea or dizziness no neuro deficits noted NIH 0 at this time. Pt PIV patent, VSS. Pt premafit draining clear yellow urine. Pt is NSR on CM as per monitor tech. Pt noted to have two 1/2 CM wounds on left buttock. Pt given call bell, call bell within reach, Pt instructed to use call bell when assistance is needed. Pt pending MRI. wait time explained, plan of care explained will continue to monitor.

## 2021-06-14 NOTE — ED ADULT NURSE REASSESSMENT NOTE - NS ED NURSE REASSESS COMMENT FT1
Assumed care of the patient @41260. Pt A&Ox2 VS afebrile. Patient in understanding of plan of care. Patient with no further questions for the RN. Resting in comfort. Call bell within reach and encouraged to use when assistance needed. Will continue to monitor.

## 2021-06-14 NOTE — ED ADULT NURSE REASSESSMENT NOTE - NSIMPLEMENTINTERV_GEN_ALL_ED
Implemented All Fall Risk Interventions:  [place] to call system. Call bell, personal items and telephone within reach. Instruct patient to call for assistance. Room bathroom lighting operational. Non-slip footwear when patient is off stretcher. Physically safe environment: no spills, clutter or unnecessary equipment. Stretcher in lowest position, wheels locked, appropriate side rails in place. Provide visual cue, wrist band, yellow gown, etc. Monitor gait and stability. Monitor for mental status changes and reorient to person, place, and time. Review medications for side effects contributing to fall risk. Reinforce activity limits and safety measures with patient and family.
Implemented All Fall Risk Interventions:  [place] to call system. Call bell, personal items and telephone within reach. Instruct patient to call for assistance. Room bathroom lighting operational. Non-slip footwear when patient is off stretcher. Physically safe environment: no spills, clutter or unnecessary equipment. Stretcher in lowest position, wheels locked, appropriate side rails in place. Provide visual cue, wrist band, yellow gown, etc. Monitor gait and stability. Monitor for mental status changes and reorient to person, place, and time. Review medications for side effects contributing to fall risk. Reinforce activity limits and safety measures with patient and family.

## 2021-06-14 NOTE — ED ADULT NURSE REASSESSMENT NOTE - NURSING NEURO ORIENTATION
disoriented to time/situation
AOx2-3 Pt can be forgetful Hx dementia/oriented to person, place and time
Disoriented to time
Aox2-3 can be forgetful

## 2021-06-14 NOTE — ED CDU PROVIDER SUBSEQUENT DAY NOTE - PROGRESS NOTE DETAILS
Pt received during signout, pt assessed at bedside. Pt without acute complaints, denies any pain.   PE: alert to self, place, knows president but not date. PERRLA, smile symmetrical, RRR, pulse 2+, CTA, sensation intact, strength good, ambulates steady gait Spoke to pt's daughter Isabel via telephone, states she received phone call from her father yesterday morning stating that her mother was very sleepy and just kept falling asleep when he tried to wake her up. Filled out MRI safety screening form via telephone with Isabel. She reports that her mother lives with  and feels that they are able to care for themselves at home.

## 2021-06-15 VITALS
DIASTOLIC BLOOD PRESSURE: 87 MMHG | RESPIRATION RATE: 18 BRPM | HEART RATE: 65 BPM | OXYGEN SATURATION: 100 % | TEMPERATURE: 98 F | SYSTOLIC BLOOD PRESSURE: 133 MMHG

## 2021-06-15 PROCEDURE — 83735 ASSAY OF MAGNESIUM: CPT

## 2021-06-15 PROCEDURE — 82272 OCCULT BLD FECES 1-3 TESTS: CPT

## 2021-06-15 PROCEDURE — 85610 PROTHROMBIN TIME: CPT

## 2021-06-15 PROCEDURE — 85730 THROMBOPLASTIN TIME PARTIAL: CPT

## 2021-06-15 PROCEDURE — 70450 CT HEAD/BRAIN W/O DYE: CPT

## 2021-06-15 PROCEDURE — 96376 TX/PRO/DX INJ SAME DRUG ADON: CPT

## 2021-06-15 PROCEDURE — 82962 GLUCOSE BLOOD TEST: CPT

## 2021-06-15 PROCEDURE — U0005: CPT

## 2021-06-15 PROCEDURE — 96367 TX/PROPH/DG ADDL SEQ IV INF: CPT

## 2021-06-15 PROCEDURE — 87186 SC STD MICRODIL/AGAR DIL: CPT

## 2021-06-15 PROCEDURE — 70498 CT ANGIOGRAPHY NECK: CPT

## 2021-06-15 PROCEDURE — 83880 ASSAY OF NATRIURETIC PEPTIDE: CPT

## 2021-06-15 PROCEDURE — 85025 COMPLETE CBC W/AUTO DIFF WBC: CPT

## 2021-06-15 PROCEDURE — 80048 BASIC METABOLIC PNL TOTAL CA: CPT

## 2021-06-15 PROCEDURE — 80053 COMPREHEN METABOLIC PANEL: CPT

## 2021-06-15 PROCEDURE — 71045 X-RAY EXAM CHEST 1 VIEW: CPT

## 2021-06-15 PROCEDURE — 84484 ASSAY OF TROPONIN QUANT: CPT

## 2021-06-15 PROCEDURE — 96365 THER/PROPH/DIAG IV INF INIT: CPT

## 2021-06-15 PROCEDURE — 85027 COMPLETE CBC AUTOMATED: CPT

## 2021-06-15 PROCEDURE — 81001 URINALYSIS AUTO W/SCOPE: CPT

## 2021-06-15 PROCEDURE — 99291 CRITICAL CARE FIRST HOUR: CPT | Mod: 25

## 2021-06-15 PROCEDURE — 93005 ELECTROCARDIOGRAM TRACING: CPT

## 2021-06-15 PROCEDURE — 0042T: CPT

## 2021-06-15 PROCEDURE — U0003: CPT

## 2021-06-15 PROCEDURE — 70496 CT ANGIOGRAPHY HEAD: CPT

## 2021-06-15 PROCEDURE — 70551 MRI BRAIN STEM W/O DYE: CPT

## 2021-06-15 PROCEDURE — 87086 URINE CULTURE/COLONY COUNT: CPT

## 2021-06-15 PROCEDURE — G0378: CPT

## 2021-06-15 PROCEDURE — 96375 TX/PRO/DX INJ NEW DRUG ADDON: CPT

## 2021-06-15 PROCEDURE — 36415 COLL VENOUS BLD VENIPUNCTURE: CPT

## 2021-06-15 RX ORDER — POTASSIUM CHLORIDE 20 MEQ
40 PACKET (EA) ORAL ONCE
Refills: 0 | Status: COMPLETED | OUTPATIENT
Start: 2021-06-15 | End: 2021-06-15

## 2021-06-15 RX ORDER — APIXABAN 2.5 MG/1
1 TABLET, FILM COATED ORAL
Qty: 60 | Refills: 0
Start: 2021-06-15 | End: 2021-07-14

## 2021-06-15 RX ADMIN — PANTOPRAZOLE SODIUM 40 MILLIGRAM(S): 20 TABLET, DELAYED RELEASE ORAL at 07:02

## 2021-06-15 RX ADMIN — GABAPENTIN 300 MILLIGRAM(S): 400 CAPSULE ORAL at 07:02

## 2021-06-15 RX ADMIN — Medication 40 MILLIGRAM(S): at 05:19

## 2021-06-15 RX ADMIN — Medication 40 MILLIEQUIVALENT(S): at 09:00

## 2021-06-15 RX ADMIN — Medication 100 MILLIGRAM(S): at 05:19

## 2021-06-15 RX ADMIN — APIXABAN 5 MILLIGRAM(S): 2.5 TABLET, FILM COATED ORAL at 05:19

## 2021-06-15 RX ADMIN — Medication 50 MILLIGRAM(S): at 08:56

## 2021-06-15 RX ADMIN — Medication 0.2 MILLIGRAM(S): at 05:19

## 2021-06-15 NOTE — PROVIDER CONTACT NOTE (OTHER) - ASSESSMENT
PT OPEN TO [facility].  REFERRAL MADE VIA ACM.  DTRS INFO .  SOC FOR 6/16.  DTR INFORMED OF SAME.  PT STABLE FOR DC FORM CM STANDPOINT.
PT ordered. chart reviewed and noted. pt received in ED, semi singh position in stretcher, agreeable to PT. pt requires assistance for functional mobility due to strength and balance deficits.0/10 pain throughout session. pt left sitting on toilet, CNA present, will follow, NAD, Call bell within reach   goals: 2-3x a week for 2 weeks  bed mobility: modified I  transfers: modified I   gait: 100 feet RW supervision

## 2021-06-15 NOTE — ED ADULT NURSE REASSESSMENT NOTE - NS ED NURSE REASSESS COMMENT FT1
Pt with NIH of 0 VSS, no complaints for RN at this time meds to be given as per orders will continue to monitor.

## 2021-06-15 NOTE — ED CDU PROVIDER DISPOSITION NOTE - PATIENT PORTAL LINK FT
You can access the FollowMyHealth Patient Portal offered by [facility] by registering at the following website: [email or web address]. By joining [employer]’s FollowMyHealth portal, you will also be able to view your health information using other applications (apps) compatible with our system.

## 2021-06-15 NOTE — ED POST DISCHARGE NOTE - REASON FOR FOLLOW-UP
spoke to daughter lorenzo [phone], informed has not been taking eliquis, finished 1 month dose since discharge on 2/4/2021.  She reports followed up with pulmonology at end of May and had rpt chest CTA that showed no e/o PE, and had US of legs on 6/9, awaiting results, results reviewed in pam BARTHOLOMEW, patient has chronic popliteal thrombus, advised daughter patient still needs use of anticoagulation.  stressed importance to f/u with pulm for continuation of medication, as treatment for DVT is AC for 3-6 months.  Daughter will contact office of [facility] and re-start AC.  Advised to c/w treatment of UTI with Keflex.  Follow up with PCP for rpt UC to ensure resolution of UTI.  Advised follow up with cardiology for syncope oberservation stay, has seen Андрей in past. Other

## 2021-06-15 NOTE — ED ADULT NURSE REASSESSMENT NOTE - NS ED NURSE REASSESS COMMENT FT1
Pt resting comfortably in bed VSS, NIH 0 at this time Pt Denies any chest pain, shortness of breath, nausea or dizziness will continue to monitor.

## 2021-06-15 NOTE — ED ADULT NURSE REASSESSMENT NOTE - GENERAL PATIENT STATE
comfortable appearance/cooperative
comfortable appearance/cooperative/smiling/interactive
comfortable appearance/cooperative/smiling/interactive

## 2021-06-15 NOTE — ED ADULT NURSE REASSESSMENT NOTE - CARDIO ASSESSMENT
---
acetaminophen 500 mg oral tablet: 1 tab(s) orally every 6 hours, As Needed -for mild pain   Calcium 500+D oral tablet, chewable: 1 tab(s) orally 2 times a day  enoxaparin: 40 milligram(s) subcutaneous every 24 hours for 6 weeks  escitalopram 10 mg oral tablet: 1 tab(s) orally once a day  lidocaine 5% topical film: Apply topically to affected area once a day   oxyCODONE 5 mg oral tablet: 1 tab(s) orally every 6 hours, As Needed -Moderate Pain (4 - 6) MDD:4 tabs  Synthroid 112 mcg (0.112 mg) oral tablet: 1 tab(s) orally once a day  tamoxifen 20 mg oral tablet: 1 tab(s) orally once a day  Toprol- mg oral tablet, extended release: 1 tab(s) orally once a day   
---
acetaminophen 325 mg oral tablet: 2 tab(s) orally every 6 hours, As needed, Temp greater or equal to 38C (100.4F), Mild Pain (1 - 3)  bethanechol 25 mg oral tablet: 1 tab(s) orally every 8 hours  Calcium 500+D oral tablet, chewable: 1 tab(s) orally 2 times a day  enoxaparin: 40 milligram(s) subcutaneous every 24 hours for 6 weeks total  escitalopram 10 mg oral tablet: 1 tab(s) orally once a day  folic acid 1 mg oral tablet: 1 tab(s) orally once a day  levothyroxine 112 mcg (0.112 mg) oral tablet: 1 tab(s) orally once a day  lidocaine 5% topical film:  topically   metoprolol succinate 25 mg oral tablet, extended release: 1 tab(s) orally once a day  Multiple Vitamins oral tablet: 1 tab(s) orally once a day  nystatin 100,000 units/g topical powder: 1 application topically 2 times a day  oxyCODONE 5 mg oral tablet: 1 tab(s) orally every 6 hours, As Needed -Moderate Pain (4 - 6) MDD:4 tabs  polyethylene glycol 3350 oral powder for reconstitution: 17 gram(s) orally once a day  senna oral tablet: 2 tab(s) orally once a day (at bedtime)  tamoxifen 20 mg oral tablet: 1 tab(s) orally once a day  tamsulosin 0.4 mg oral capsule: 1 cap(s) orally once a day (at bedtime)  traZODone 50 mg oral tablet: 1 tab(s) orally once a day (at bedtime)  zinc oxide 40% topical ointment: 1 application topically 2 times a day  
---
---

## 2021-06-15 NOTE — ED CDU PROVIDER DISPOSITION NOTE - NSFOLLOWUPINSTRUCTIONS_ED_ALL_ED_FT
Please take keflex for urinary tract infection  Deandre up with primary care doctor outpatiently, pt with low potassium and magnesium in the ER replaced repeat labs this week with PMD

## 2021-06-15 NOTE — ED ADULT NURSE REASSESSMENT NOTE - NURSING NEURO LEVEL OF CONSCIOUSNESS
alert and awake/follows commands
follows commands
alert and awake/follows commands
alert and awake/follows commands
alert and awake

## 2021-06-15 NOTE — ED CDU PROVIDER DISPOSITION NOTE - ATTENDING CONTRIBUTION TO CARE
I, Erik Mcdaniel, performed a face to face bedside interview with this patient regarding history of present illness, review of symptoms and relevant past medical, social and family history.  I completed an independent physical examination. I have communicated the patient’s plan of care and disposition with the ACP.      pt for work up of syncopal episode  pt with mr of brain negative acute findings;  pt to be txed for uti;

## 2021-06-15 NOTE — ED ADULT NURSE REASSESSMENT NOTE - COMFORT CARE
meal provided/plan of care explained/repositioned/side rails up/wait time explained
meal provided/plan of care explained
plan of care explained/side rails up
Premafit in place/plan of care explained/po fluids offered/side rails up/wait time explained
Premafit in place/meal provided/plan of care explained/po fluids offered/side rails up/wait time explained

## 2021-06-15 NOTE — ED CDU PROVIDER DISPOSITION NOTE - CLINICAL COURSE
pt is a 84 y/o female with a pmhx of HTN, HLD, GERD, dementia presenting to the ed for possible syncopal episode - pt was code stroke in the ed, MRI preformed no abnormalities, PT saw patient - home PT, pt noted to have uti keflex sent to pharamcay follow up with pmd pt explained dc instructions

## 2021-06-15 NOTE — ED ADULT NURSE REASSESSMENT NOTE - NS ED NURSE REASSESS COMMENT FT1
Assumed care of the patient @0730. Pt A&Ox2, VSS afebrile. Pt awaiting discharge.  Patient in understanding of plan of care. Patient with no further questions for the RN. Resting in comfort. Call bell within reach and encouraged to use when assistance needed. Will continue to monitor.

## 2021-06-16 ENCOUNTER — NON-APPOINTMENT (OUTPATIENT)
Age: 86
End: 2021-06-16

## 2021-06-22 NOTE — ED CDU PROVIDER SUBSEQUENT DAY NOTE - PHYSICAL EXAMINATION
Constitutional - well-developed; well nourished. Head - NCAT. Airway patent. Eyes - PERRL. CV - RRR. no murmur. no edema. Pulm - CTAB. Abd - soft, nt. no rebound. no guarding. Neuro - A&Ox2. strength 5/5 x4. sensation intact x4. normal gait. Skin - No rash. MSK - normal ROM.
Constitutional - well-developed; well nourished. Head - NCAT. Airway patent. Eyes - PERRL. CV - RRR. no murmur. no edema. Pulm - CTAB. Abd - soft, nt. no rebound. no guarding. Neuro - A&Ox2. strength 5/5 x4. sensation intact x4. normal gait. Skin - No rash. MSK - normal ROM.

## 2021-06-22 NOTE — ED CDU PROVIDER SUBSEQUENT DAY NOTE - ATTENDING CONTRIBUTION TO CARE
I, Erik Mcdaniel, performed a face to face bedside interview with this patient regarding history of present illness, review of symptoms and relevant past medical, social and family history.  I completed an independent physical examination. I have communicated the patient’s plan of care and disposition with the ACP.      pt for discharge in the morning . no events overnight;
I, Peyton Flor MD have personally performed a face to face diagnostic evaluation on this patient.  I have reviewed the ACP note and agree with the history, exam, and plan of care, except as noted.     Exam:  Head: atraumatic, normacephalic  Face: atraumatic, no crepitus no orbiral/maxillary/mandibular ttp  throat: uvula midline no exudates  eyes: perrla eomi  heart: rrr s1s2  lungs: ctab  abd: soft, nt nd +bs no rebound/guarding no cva ttp  skin: warm  LE: no swelling, no calf ttp  back: no midline cervical/thoracic/lumbar ttp  NEURO: AAOX 2 (does not know the date; knows the president) cn iii-xii intact kenneth finger to nose strength 5/5 in upper and lower    --in obs for cva work up; ams initially now resolved + uti tx with abx; as per family pt was very sleepy no syncope no one sided weakness--pending mri--

## 2021-06-22 NOTE — ED CDU PROVIDER SUBSEQUENT DAY NOTE - NS ED ROS FT
No fever/chills, No photophobia/eye pain/changes in vision, No ear pain/sore throat/dysphagia, No chest pain/palpitations, no SOB/cough/wheeze/stridor, No abdominal pain, No N/V/D, no dysuria/frequency/discharge, No neck/back pain, no rash, +syncope/LOC.
No fever/chills, No photophobia/eye pain/changes in vision, No ear pain/sore throat/dysphagia, No chest pain/palpitations, no SOB/cough/wheeze/stridor, No abdominal pain, No N/V/D, no dysuria/frequency/discharge, No neck/back pain, no rash, +syncope/LOC.

## 2021-06-22 NOTE — ED CDU PROVIDER SUBSEQUENT DAY NOTE - MEDICAL DECISION MAKING DETAILS
Continue CDU intake orders await consult recommendations, lab results, and imaging results.
Pt pending to be discharged from ED this AM, after being in contact with group home for return

## 2021-06-22 NOTE — ED CDU PROVIDER SUBSEQUENT DAY NOTE - PMH
Cardiac rhythm disturbance    Diverticulitis    GERD (gastroesophageal reflux disease)    Hiatal hernia    HLD (hyperlipidemia)    HTN (hypertension)    
Cardiac rhythm disturbance    Diverticulitis    GERD (gastroesophageal reflux disease)    Hiatal hernia    HLD (hyperlipidemia)    HTN (hypertension)

## 2021-06-22 NOTE — ED CDU PROVIDER SUBSEQUENT DAY NOTE - HISTORY
pt with no acute complaints while in observation
PT placed in OBS, has had no acute incidents or complaints while in CDU PT is stable. PT Placed in OBS for MR after having episode of lethargy while at home, being tx for UTI, YESSI.

## 2021-06-30 ENCOUNTER — APPOINTMENT (OUTPATIENT)
Dept: ORTHOPEDIC SURGERY | Facility: CLINIC | Age: 86
End: 2021-06-30
Payer: MEDICARE

## 2021-06-30 VITALS — WEIGHT: 198 LBS | BODY MASS INDEX: 38.87 KG/M2 | HEIGHT: 60 IN

## 2021-06-30 PROCEDURE — 99213 OFFICE O/P EST LOW 20 MIN: CPT | Mod: 25

## 2021-06-30 PROCEDURE — 20610 DRAIN/INJ JOINT/BURSA W/O US: CPT | Mod: LT

## 2021-06-30 NOTE — PHYSICAL EXAM
[Walker] : ambulates with walker [LE] : Sensory: Intact in bilateral lower extremities [ALL] : dorsalis pedis, posterior tibial, femoral, popliteal, and radial 2+ and symmetric bilaterally [Normal] : Alert and in no acute distress [Poor Appearance] : well-appearing [de-identified] : GENERAL APPEARANCE: Well nourished and hydrated, pleasant, alert, and oriented x 3. Appears their stated age. \par HEENT: Normocephalic, extraocular eye motion intact. Nasal septum midline. Oral cavity clear. External auditory canal clear. \par RESPIRATORY: Breath sounds clear and audible in all lobes. No wheezing, No accessory muscle use.\par CARDIOVASCULAR: No apparent abnormalities. No lower leg edema. No varicosities. Pedal pulses are palpable.\par NEUROLOGIC: Sensation is normal, no muscle weakness in the upper or lower extremities.\par DERMATOLOGIC: No apparent skin lesions, moist, warm, no rash.\par SPINE: Cervical spine appears normal and moves freely; thoracic spine appears normal and moves freely; lumbosacral spine appears normal and moves freely, normal, nontender.\par MUSCULOSKELETAL: Hands, wrists, and elbows are normal and move freely, shoulders are normal and move freely.  [de-identified] : Bilateral knee exam shows medial joint line tenderness, no effusion Varus alignment.

## 2021-06-30 NOTE — HISTORY OF PRESENT ILLNESS
[Pain Location] : pain [7] : a current pain level of 7/10 [3] : a minimum pain level of 3/10 [8] : a maximum pain level of 8/10 [Bending] : worsened by bending [Walking] : worsened by walking [Rest] : relieved by rest [de-identified] : 86 y/o F presents with her HHA b/l knee pain. She was experiencing more pain in the right knee than in the left knee. She had cortisone injection on 5/17/21 and today she only c/o left knee pain.  Her pain is in the medial aspect of the knee. 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 The pt has a stomach abscess and was given antibiotics which caused her to get a rash. She is here today to start gel injections for the left knee. Pt walks with walker.

## 2021-06-30 NOTE — REASON FOR VISIT
[Follow-Up Visit] : a follow-up visit for [Other: ____] : [unfilled] [FreeTextEntry2] : Left knee Euflexxa inj#1. LOt# [id number], Expires on 04/24/2022

## 2021-06-30 NOTE — END OF VISIT
[FreeTextEntry3] : I, Immanuel Ortiz, acted solely as a scribe for Dr. Kendell Johnson on this date 06/30/2021.

## 2021-06-30 NOTE — DISCUSSION/SUMMARY
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 Conservative therapy and surgical options discussed in detail with the patient. Based on imaging, she is a candidate for a staged bilateral TKA. The patient would like to continue to exhaust conservative therapies. She is only experiencing pain in the left knee today. She opted to receive 1/3 Euflexxa injection in the left knee which she tolerated well. F/u in 1 week for 2/3 Euflexxa injection. \par \par The patient is a 85 year individual with end stage arthritis of their b/l knee joint. Based upon the patient's continued symptoms and failure to respond to conservative treatment I have recommended a staged b/l total knee arthroplasty for this patient. A long discussion took place with the patient describing what a total joint replacement is and what the procedure would entail. A total knee arthroplasty model, similar to the implant that was used during the operation, was utilized to demonstrate and to discuss the various bearing surfaces of the implants. The hospitalization and post-operative care and rehabilitation were also discussed. The use of perioperative antibiotics and DVT prophylaxis were discussed. The risk, benefits and alternatives to a surgical intervention were discussed at length with the patient. The patient was also advised of risks related to the medical comorbidities, elevated body mass index (BMI), and smoking where applicable. We discussed how to reduce modifiable risk factors and encouraged smoking cessation were applicable.. A lengthy discussion took place to review the most common complications including but not limited to: deep vein thrombosis, pulmonary embolus, heart attack, stroke, infection, wound breakdown, numbness, damage to nerves, tendon, muscles, arteries or other blood vessels, death and other possible complications from anesthesia. The patient was told that we will take steps to minimize these risks by using sterile technique, antibiotics and DVT prophylaxis when appropriate and follow the patient postoperatively in the office setting to monitor progress. The possibility of recurrent pain, no improvement in pain and actual worsening of pain were also discussed with the patient.\par The discharge plan of care focused on the patient going home following surgery. The patient was encouraged to make the necessary arrangements to have someone stay with them when they are discharged home. Following discharge, a home care nurse was to the patient. The home care nurse would open the patient’s home care case and request home physical therapy services. Home physical therapy was to commence following discharge provided it was appropriate and covered by the health insurance benefit plan. \par The benefits of surgery were discussed with the patient including the potential for improving her current clinical condition through operative intervention. Alternatives to surgical intervention including continued conservative management were also discussed in detail. All questions were answered to the satisfaction of the patient. The treatment plan of care, as well as a model of a total knee arthroplasty equivalent to the one that will be used for their total joint replacement, was shared with the patient. The patient agreed to the plan of care as well as the use of implants in their total joint replacement.

## 2021-06-30 NOTE — PROCEDURE
[de-identified] : I injected Euflexxa in the patient's left knee\par \par Knee injection viscosupplementation: I discussed at length with the patient the planned injection. The risks, benefits, convalescence and alternatives were reviewed. The possible side effects discussed included but were not limited to: pain, swelling, heat and redness. There symptoms are generally mild but if they are extensive then contact the office. Giving pain relievers by mouth such as NSAID's or Tylenol can generally treat the reactions to injection. Rare cases of infection have been noted. Rash, hives and itching may occur post injection. If you have muscle pain or cramps, flushing and or swelling of the face, rapid heart beat, nausea, dizziness, fever, chills, headache, difficulty breathing, swelling in the arms or legs, or have a prickly feeling of your skin, contact a health care provider immediately. Following this discussion, the knee was prepped with alcohol and under sterile condition the injection was performed through a superolateral injection site with a 21 gauge needle. The needle was introduced into the joint, aspiration was performed to ensure intra-articular placement and the medication was injected. Upon withdrawal of the needle the site was cleaned with alcohol and a band aid applied. The patient tolerated the injection well and there were no adverse effects. Post injection instructions included no strenuous activity for 24 hours, cryotherapy and if there are any adverse effects to contact the office.

## 2021-07-12 ENCOUNTER — APPOINTMENT (OUTPATIENT)
Dept: VASCULAR SURGERY | Facility: CLINIC | Age: 86
End: 2021-07-12
Payer: MEDICARE

## 2021-07-12 VITALS
SYSTOLIC BLOOD PRESSURE: 107 MMHG | HEART RATE: 70 BPM | DIASTOLIC BLOOD PRESSURE: 66 MMHG | BODY MASS INDEX: 38.3 KG/M2 | HEIGHT: 59 IN | TEMPERATURE: 97.2 F | OXYGEN SATURATION: 95 % | WEIGHT: 190 LBS

## 2021-07-12 DIAGNOSIS — Z86.69 PERSONAL HISTORY OF OTHER DISEASES OF THE NERVOUS SYSTEM AND SENSE ORGANS: ICD-10-CM

## 2021-07-12 DIAGNOSIS — I82.401 ACUTE EMBOLISM AND THROMBOSIS OF UNSPECIFIED DEEP VEINS OF RIGHT LOWER EXTREMITY: ICD-10-CM

## 2021-07-12 PROCEDURE — 93970 EXTREMITY STUDY: CPT

## 2021-07-12 PROCEDURE — 99203 OFFICE O/P NEW LOW 30 MIN: CPT

## 2021-07-14 PROBLEM — I82.401 RIGHT LEG DVT: Status: ACTIVE | Noted: 2021-07-14

## 2021-07-14 PROBLEM — Z86.69 HISTORY OF SLEEP APNEA: Status: RESOLVED | Noted: 2021-07-13 | Resolved: 2021-07-14

## 2021-07-14 RX ORDER — LIDOCAINE 5% 700 MG/1
5 PATCH TOPICAL
Qty: 60 | Refills: 0 | Status: ACTIVE | COMMUNITY
Start: 2021-07-06

## 2021-07-14 RX ORDER — FUROSEMIDE 20 MG/1
20 TABLET ORAL
Qty: 90 | Refills: 0 | Status: ACTIVE | COMMUNITY
Start: 2021-07-08

## 2021-07-14 RX ORDER — GABAPENTIN 300 MG/1
300 CAPSULE ORAL
Qty: 180 | Refills: 0 | Status: ACTIVE | COMMUNITY
Start: 2021-07-08

## 2021-07-14 RX ORDER — ACYCLOVIR 50 MG/G
5 OINTMENT TOPICAL
Qty: 60 | Refills: 0 | Status: ACTIVE | COMMUNITY
Start: 2021-07-09

## 2021-07-14 NOTE — HISTORY OF PRESENT ILLNESS
[FreeTextEntry1] : 84 yo F with history of HTN, hypercholesterolemia, and PETER presenting for evaluation of right popliteal chronic DVT. Pt has a history of PE in Jan 2021 and started on Eliquis. Repeat US in June 2021 demonstrated chronic DVT of right popliteal vein. Denies pain or swelling in the leg, chest pain, SOB, or BALLESTEROS. Compliant with [employer].

## 2021-07-14 NOTE — ASSESSMENT
[FreeTextEntry1] : 86 yo F with history of HTN, hypercholesterolemia, and PETER presenting for evaluation of right popliteal chronic DVT. \par \par Venous duplex demonstrates chronic R popliteal DVT, nonocclusive. [Arterial/Venous Disease] : arterial/venous disease [Foot care/Footwear] : foot care/footwear

## 2021-07-14 NOTE — PHYSICAL EXAM
[Normal Rate and Rhythm] : normal rate and rhythm [2+] : left 2+ [Ankle Swelling (On Exam)] : not present [Varicose Veins Of Lower Extremities] : not present [] : not present [No Rash or Lesion] : No rash or lesion [Alert] : alert [Oriented to Person] : oriented to person [Oriented to Place] : oriented to place [Oriented to Time] : oriented to time [Calm] : calm [de-identified] : NAD [de-identified] : unlabored breathing [de-identified] : FROM of all 4 extremities\par

## 2021-07-16 ENCOUNTER — APPOINTMENT (OUTPATIENT)
Dept: ORTHOPEDIC SURGERY | Facility: CLINIC | Age: 86
End: 2021-07-16
Payer: MEDICARE

## 2021-07-16 VITALS
DIASTOLIC BLOOD PRESSURE: 71 MMHG | HEART RATE: 72 BPM | WEIGHT: 190 LBS | BODY MASS INDEX: 38.3 KG/M2 | HEIGHT: 59 IN | SYSTOLIC BLOOD PRESSURE: 111 MMHG

## 2021-07-16 PROCEDURE — 20610 DRAIN/INJ JOINT/BURSA W/O US: CPT | Mod: 50

## 2021-07-22 ENCOUNTER — APPOINTMENT (OUTPATIENT)
Dept: ORTHOPEDIC SURGERY | Facility: CLINIC | Age: 86
End: 2021-07-22
Payer: MEDICARE

## 2021-07-22 VITALS — HEIGHT: 59 IN | WEIGHT: 190 LBS | BODY MASS INDEX: 38.3 KG/M2

## 2021-07-22 PROCEDURE — 20610 DRAIN/INJ JOINT/BURSA W/O US: CPT | Mod: 50

## 2021-07-22 NOTE — REASON FOR VISIT
[Follow-Up Visit] : a follow-up visit for [Other: ____] : [unfilled] [FreeTextEntry2] : Left knee Euflexxa inj#3. LOt# [id number], Expires on 04/24/2022. Right knee Euflexxa inj#2 Lot# [id number], Expires on 4/24/2022.\par  \par

## 2021-07-22 NOTE — PROCEDURE
[de-identified] : I injected Euflexxa in the patient's both knees\par \par Knee injection viscosupplementation: I discussed at length with the patient the planned injection. The risks, benefits, convalescence and alternatives were reviewed. The possible side effects discussed included but were not limited to: pain, swelling, heat and redness. There symptoms are generally mild but if they are extensive then contact the office. Giving pain relievers by mouth such as NSAID's or Tylenol can generally treat the reactions to injection. Rare cases of infection have been noted. Rash, hives and itching may occur post injection. If you have muscle pain or cramps, flushing and or swelling of the face, rapid heart beat, nausea, dizziness, fever, chills, headache, difficulty breathing, swelling in the arms or legs, or have a prickly feeling of your skin, contact a health care provider immediately. Following this discussion, the knee was prepped with alcohol and under sterile condition the injection was performed through a superolateral injection site with a 21 gauge needle. The needle was introduced into the joint, aspiration was performed to ensure intra-articular placement and the medication was injected. Upon withdrawal of the needle the site was cleaned with alcohol and a band aid applied. The patient tolerated the injection well and there were no adverse effects. Post injection instructions included no strenuous activity for 24 hours, cryotherapy and if there are any adverse effects to contact the office.

## 2021-07-22 NOTE — DISCUSSION/SUMMARY
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 Conservative therapy and surgical options discussed in detail with the patient. Based on imaging, she is a candidate for a staged bilateral TKA. The patient would like to continue to exhaust conservative therapies. She opted to receive 3/3 Euflexxa injections in the left knee  and 2/3 euflexxa in the right knee which she tolerated well. F/u in 1 week for  3rd right knee Euflexxa injection. \par \par The patient is a 85 year individual with end stage arthritis of their b/l knee joint. Based upon the patient's continued symptoms and failure to respond to conservative treatment I have recommended a staged b/l total knee arthroplasty for this patient. A long discussion took place with the patient describing what a total joint replacement is and what the procedure would entail. A total knee arthroplasty model, similar to the implant that was used during the operation, was utilized to demonstrate and to discuss the various bearing surfaces of the implants. The hospitalization and post-operative care and rehabilitation were also discussed. The use of perioperative antibiotics and DVT prophylaxis were discussed. The risk, benefits and alternatives to a surgical intervention were discussed at length with the patient. The patient was also advised of risks related to the medical comorbidities, elevated body mass index (BMI), and smoking where applicable. We discussed how to reduce modifiable risk factors and encouraged smoking cessation were applicable.. A lengthy discussion took place to review the most common complications including but not limited to: deep vein thrombosis, pulmonary embolus, heart attack, stroke, infection, wound breakdown, numbness, damage to nerves, tendon, muscles, arteries or other blood vessels, death and other possible complications from anesthesia. The patient was told that we will take steps to minimize these risks by using sterile technique, antibiotics and DVT prophylaxis when appropriate and follow the patient postoperatively in the office setting to monitor progress. The possibility of recurrent pain, no improvement in pain and actual worsening of pain were also discussed with the patient.\par The discharge plan of care focused on the patient going home following surgery. The patient was encouraged to make the necessary arrangements to have someone stay with them when they are discharged home. Following discharge, a home care nurse was to the patient. The home care nurse would open the patient’s home care case and request home physical therapy services. Home physical therapy was to commence following discharge provided it was appropriate and covered by the health insurance benefit plan. \par The benefits of surgery were discussed with the patient including the potential for improving her current clinical condition through operative intervention. Alternatives to surgical intervention including continued conservative management were also discussed in detail. All questions were answered to the satisfaction of the patient. The treatment plan of care, as well as a model of a total knee arthroplasty equivalent to the one that will be used for their total joint replacement, was shared with the patient. The patient agreed to the plan of care as well as the use of implants in their total joint replacement.

## 2021-08-02 ENCOUNTER — APPOINTMENT (OUTPATIENT)
Dept: ORTHOPEDIC SURGERY | Facility: CLINIC | Age: 86
End: 2021-08-02
Payer: MEDICARE

## 2021-08-02 VITALS
HEART RATE: 66 BPM | WEIGHT: 190 LBS | SYSTOLIC BLOOD PRESSURE: 118 MMHG | BODY MASS INDEX: 38.3 KG/M2 | DIASTOLIC BLOOD PRESSURE: 68 MMHG | HEIGHT: 59 IN

## 2021-08-02 DIAGNOSIS — M17.12 UNILATERAL PRIMARY OSTEOARTHRITIS, LEFT KNEE: ICD-10-CM

## 2021-08-02 PROCEDURE — 20610 DRAIN/INJ JOINT/BURSA W/O US: CPT | Mod: RT

## 2021-08-02 NOTE — PROCEDURE
[de-identified] : I injected Euflexxa in the patient's right knee\par \par Knee injection viscosupplementation: I discussed at length with the patient the planned injection. The risks, benefits, convalescence and alternatives were reviewed. The possible side effects discussed included but were not limited to: pain, swelling, heat and redness. There symptoms are generally mild but if they are extensive then contact the office. Giving pain relievers by mouth such as NSAID's or Tylenol can generally treat the reactions to injection. Rare cases of infection have been noted. Rash, hives and itching may occur post injection. If you have muscle pain or cramps, flushing and or swelling of the face, rapid heart beat, nausea, dizziness, fever, chills, headache, difficulty breathing, swelling in the arms or legs, or have a prickly feeling of your skin, contact a health care provider immediately. Following this discussion, the knee was prepped with alcohol and under sterile condition the injection was performed through a superolateral injection site with a 21 gauge needle. The needle was introduced into the joint, aspiration was performed to ensure intra-articular placement and the medication was injected. Upon withdrawal of the needle the site was cleaned with alcohol and a band aid applied. The patient tolerated the injection well and there were no adverse effects. Post injection instructions included no strenuous activity for 24 hours, cryotherapy and if there are any adverse effects to contact the office.

## 2021-08-02 NOTE — DISCUSSION/SUMMARY
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 Conservative therapy and surgical options discussed in detail with the patient. Based on imaging, she is a candidate for a staged bilateral TKA. The patient would like to continue to exhaust conservative therapies. She opted to receive 3/3 euflexxa in the right knee which she tolerated well.  he finished left knee euflexxa last week F/u in 3M for re evaluation \par \par The patient is a 85 year individual with end stage arthritis of their b/l knee joint. Based upon the patient's continued symptoms and failure to respond to conservative treatment I have recommended a staged b/l total knee arthroplasty for this patient. A long discussion took place with the patient describing what a total joint replacement is and what the procedure would entail. A total knee arthroplasty model, similar to the implant that was used during the operation, was utilized to demonstrate and to discuss the various bearing surfaces of the implants. The hospitalization and post-operative care and rehabilitation were also discussed. The use of perioperative antibiotics and DVT prophylaxis were discussed. The risk, benefits and alternatives to a surgical intervention were discussed at length with the patient. The patient was also advised of risks related to the medical comorbidities, elevated body mass index (BMI), and smoking where applicable. We discussed how to reduce modifiable risk factors and encouraged smoking cessation were applicable.. A lengthy discussion took place to review the most common complications including but not limited to: deep vein thrombosis, pulmonary embolus, heart attack, stroke, infection, wound breakdown, numbness, damage to nerves, tendon, muscles, arteries or other blood vessels, death and other possible complications from anesthesia. The patient was told that we will take steps to minimize these risks by using sterile technique, antibiotics and DVT prophylaxis when appropriate and follow the patient postoperatively in the office setting to monitor progress. The possibility of recurrent pain, no improvement in pain and actual worsening of pain were also discussed with the patient.\par The discharge plan of care focused on the patient going home following surgery. The patient was encouraged to make the necessary arrangements to have someone stay with them when they are discharged home. Following discharge, a home care nurse was to the patient. The home care nurse would open the patient’s home care case and request home physical therapy services. Home physical therapy was to commence following discharge provided it was appropriate and covered by the health insurance benefit plan. \par The benefits of surgery were discussed with the patient including the potential for improving her current clinical condition through operative intervention. Alternatives to surgical intervention including continued conservative management were also discussed in detail. All questions were answered to the satisfaction of the patient. The treatment plan of care, as well as a model of a total knee arthroplasty equivalent to the one that will be used for their total joint replacement, was shared with the patient. The patient agreed to the plan of care as well as the use of implants in their total joint replacement.

## 2021-08-02 NOTE — REASON FOR VISIT
[Initial Visit] : an initial visit for [FreeTextEntry2] : left knee  Euflexxa inj #3   LOT [id number]  EXP 2022/08/24

## 2021-10-05 ENCOUNTER — APPOINTMENT (OUTPATIENT)
Dept: ORTHOPEDIC SURGERY | Facility: CLINIC | Age: 86
End: 2021-10-05
Payer: MEDICARE

## 2021-10-05 VITALS
WEIGHT: 190 LBS | DIASTOLIC BLOOD PRESSURE: 69 MMHG | HEIGHT: 59 IN | HEART RATE: 62 BPM | BODY MASS INDEX: 38.3 KG/M2 | SYSTOLIC BLOOD PRESSURE: 129 MMHG

## 2021-10-05 DIAGNOSIS — M17.11 UNILATERAL PRIMARY OSTEOARTHRITIS, RIGHT KNEE: ICD-10-CM

## 2021-10-05 PROCEDURE — 99213 OFFICE O/P EST LOW 20 MIN: CPT | Mod: 25

## 2021-10-05 PROCEDURE — 20610 DRAIN/INJ JOINT/BURSA W/O US: CPT | Mod: RT

## 2021-10-05 NOTE — PHYSICAL EXAM
[Walker] : ambulates with walker [LE] : Sensory: Intact in bilateral lower extremities [ALL] : dorsalis pedis, posterior tibial, femoral, popliteal, and radial 2+ and symmetric bilaterally [Normal] : Alert and in no acute distress [Poor Appearance] : well-appearing [de-identified] : GENERAL APPEARANCE: Well nourished and hydrated, pleasant, alert, and oriented x 3. Appears their stated age. \par HEENT: Normocephalic, extraocular eye motion intact. Nasal septum midline. Oral cavity clear. External auditory canal clear. \par RESPIRATORY: Breath sounds clear and audible in all lobes. No wheezing, No accessory muscle use.\par CARDIOVASCULAR: No apparent abnormalities. No lower leg edema. No varicosities. Pedal pulses are palpable.\par NEUROLOGIC: Sensation is normal, no muscle weakness in the upper or lower extremities.\par DERMATOLOGIC: No apparent skin lesions, moist, warm, no rash.\par SPINE: Cervical spine appears normal and moves freely; thoracic spine appears normal and moves freely; lumbosacral spine appears normal and moves freely, normal, nontender.\par MUSCULOSKELETAL: Hands, wrists, and elbows are normal and move freely, shoulders are normal and move freely.  [de-identified] : Bilateral knee exam shows medial joint line tenderness, no effusion Varus alignment.

## 2021-10-05 NOTE — HISTORY OF PRESENT ILLNESS
[Pain Location] : pain [Stable] : stable [4] : a current pain level of 4/10 [de-identified] : 85 y/o F presents with her HHA b/l knee pain. She was experiencing more pain in the right knee than in the left knee. \par she had cortisone injection in 5/17/21 and today she c/o both knee pain. she had 1 st Euflexxa in the left knee. Her pain is in the medial aspect of the knee. 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 She received a cortisone injection in May 2021 which provided some relief. The pt has a stomach abscess and was given antibiotics which caused her to get a rash. She is here today requesting cortisone injection in the right knee. she is moving to [place] next week. \par she had good relief with Euflexxa injection in both knees\par pt walks with walker.

## 2021-10-05 NOTE — DISCUSSION/SUMMARY
[de-identified] : Surgical risks reviewed. 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 Conservative therapy and surgical options discussed in detail with the patient. She is a candidate for a staged bilateral TKA. We discussed pre-op, surgery, and post-op in detail. The pt is not interested in pursuing surgery at this time and would like to receive injections for pain management. she had modest relief with H.A injection. Pt opted to receive a cortisone injection in the right knee today and tolerated it well. pt is moving to [place], she will resume care in [place]. \par \par The patient is a 86 year individual with end stage arthritis of their b/l knee joint. Based upon the patient's continued symptoms and failure to respond to conservative treatment I have recommended a staged b/l total knee arthroplasty for this patient. A long discussion took place with the patient describing what a total joint replacement is and what the procedure would entail. A total knee arthroplasty model, similar to the implant that was used during the operation, was utilized to demonstrate and to discuss the various bearing surfaces of the implants. The hospitalization and post-operative care and rehabilitation were also discussed. The use of perioperative antibiotics and DVT prophylaxis were discussed. The risk, benefits and alternatives to a surgical intervention were discussed at length with the patient. The patient was also advised of risks related to the medical comorbidities, elevated body mass index (BMI), and smoking where applicable. We discussed how to reduce modifiable risk factors and encouraged smoking cessation were applicable.. A lengthy discussion took place to review the most common complications including but not limited to: deep vein thrombosis, pulmonary embolus, heart attack, stroke, infection, wound breakdown, numbness, damage to nerves, tendon, muscles, arteries or other blood vessels, death and other possible complications from anesthesia. The patient was told that we will take steps to minimize these risks by using sterile technique, antibiotics and DVT prophylaxis when appropriate and follow the patient postoperatively in the office setting to monitor progress. The possibility of recurrent pain, no improvement in pain and actual worsening of pain were also discussed with the patient.\par The discharge plan of care focused on the patient going home following surgery. The patient was encouraged to make the necessary arrangements to have someone stay with them when they are discharged home. Following discharge, a home care nurse was to the patient. The home care nurse would open the patient’s home care case and request home physical therapy services. Home physical therapy was to commence following discharge provided it was appropriate and covered by the health insurance benefit plan. \par The benefits of surgery were discussed with the patient including the potential for improving her current clinical condition through operative intervention. Alternatives to surgical intervention including continued conservative management were also discussed in detail. All questions were answered to the satisfaction of the patient. The treatment plan of care, as well as a model of a total knee arthroplasty equivalent to the one that will be used for their total joint replacement, was shared with the patient. The patient agreed to the plan of care as well as the use of implants in their total joint replacement. \par

## 2021-10-05 NOTE — PROCEDURE
[de-identified] : I injected the patient's right knee today with cortisone.\par \par I discussed at length with the patient the planned steroid and lidocaine injection. The risks, benefits, convalescence and alternatives were reviewed. The possible side effects discussed included but were not limited to: pain, swelling, heat, bleeding, and redness. Symptoms are generally mild but if they are extensive then contact the office. Giving pain relievers by mouth such as NSAIDs or Tylenol can generally treat the reactions to steroid and lidocaine. Rare cases of infection have been noted. Rash, hives and itching may occur post injection. If you have muscle pain or cramps, flushing and or swelling of the face, rapid heart beat, nausea, dizziness, fever, chills, headache, difficulty breathing, swelling in the arms or legs, or have a prickly feeling of your skin, contact a health care provider immediately. Following this discussion, the knee was prepped with Alcohol and under sterile condition the 80 mg Depo-Medrol and 6 cc Lidocaine injection was performed with a 20 gauge needle through a superolateral injection site. The needle was introduced into the joint, aspiration was performed to ensure intra-articular placement and the medication was injected. Upon withdrawal of the needle the site was cleaned with alcohol and a band aid applied. The patient tolerated the injection well and there were no adverse effects. Post injection instructions included no strenuous activity for 24 hours, cryotherapy and if there are any adverse effects to contact the office. \par

## 2022-01-03 NOTE — ED ADULT NURSE NOTE - TEMPLATE LIST FOR HEAD TO TOE ASSESSMENT
Dr Yasemin Flores called on cell and connected with Dr Evans Prado  01/03/22 9327 Communicable/Infectious

## 2022-01-11 NOTE — H&P ADULT - NSHPPOAPULMEMBOLUS_GEN_A_CORE
Patient has annual appointment scheduled for 02/11. He would like to get annual lab work before appointment with antibody test. Orders pended. Please advise   no

## 2022-02-03 NOTE — PATIENT DISCUSSION
ANGLES OPEN PO IOL OU.
Despite some risk factors, the patient does not demonstrate definitive evidence of glaucoma at this time.
Discussed condition and exacerbating conditions/situations (e.g., dry/arid environments, overhead fans, air conditioners, side effect of medications).
Discussed future YAG LASER Capsulotomy with patient.
Discussed importance of yearly eye exams and provided patient with literature.
Discussed lid hygiene, warm compress and eyelid wash.
Discussed other treatments including steroid eyedrops, Restasis, Xiidra, punctal plugs, omega 3 fatty acids, Zylet/ Tobradex.
Glasses Prescription given to patient.
Minimal risk for malignant transformation discussed with patient. Recommend to observe and follow with serial exams.
Monitor.
Patient made aware of 24/7 emergency services.
Patient understands condition, prognosis and need for follow up care.
Recommended artificial tears to use: 1 drop 4x a day in both eyes.
STRONG FAMILY H/O GLAUCOMA.
Stable.
The IOP is in the target range.
The OCT is suspicious for glaucomatous damage OS/REPEAT C VF'S 4-6 WEEKS. [facility] CONSULT C NO IMPROVEMENT.
DISCHARGE

## 2022-03-14 NOTE — PATIENT DISCUSSION
The OCT is suspicious for glaucomatous damage OS/REPEAT C VF'S 4-6 WEEKS. [facility] [facility] CONSULT C NO IMPROVEMENT.

## 2023-03-09 NOTE — ED ADULT NURSE NOTE - CAS DISCH ACCOMP BY
52F w/ pmhx of HTN HLD DM smoker present for chest pain. She was here yesterday to get CCTA or stress test but left AMA before getting the study done. She endorses intermittent SOB and pain between her shoulder blades for the past 3 months intermittently. Denies fever chills nausea vomiting diaphoresis abd pain back pain urinary sx or diarrhea. Spouse

## 2023-09-22 NOTE — ED PROVIDER NOTE - EKG #1 DATE/TIME
29-Dec-2017 19:07
Patient requests all Lab, Cardiology, and Radiology Results on their Discharge Instructions

## 2024-06-20 NOTE — ED ADULT TRIAGE NOTE - HEIGHT IN INCHES
Chief Complaint  Hypertension (Refill )    History of Present Illness  Maine Taylor is a 63 y.o. female who presents to [facility] with a past medical history of  Past Medical History:   Diagnosis Date    Arthritis     Osteopenia     Sleep apnea        HPI  Patient presents to the office today for follow-up on chronic health conditions and obtain refills of medications.  Patient had fasting labs drawn about 2 months ago.    HTN: pt states that her BP fluctuates slightly more than with the Lisinopril. Pt was changed due to a dry cough. Dry cough has resolved. Morning readings are 140-150 and as high as 160 systolic. Evening readings are 120-130's systolic.     DM: Patient is currently diet controlled diabetic with A1c of 6.80%. she is keeping track of saturated fats, cholesterol, and carbohydrates and started exercising and weight is down 16lbs.     Taking diclofenac as needed and notes she is taking it more than she did previously. .    Objective   Vital Signs:   Vitals:    06/20/24 0812   BP: 132/82   BP Location: Left arm   Patient Position: Sitting   Pulse: 73   Temp: 97.5 °F (36.4 °C)   TempSrc: Temporal   SpO2: 95%   Weight: 79.2 kg (174 lb 11.2 oz)     Body mass index is 29.07 kg/m².    Wt Readings from Last 3 Encounters:   06/20/24 79.2 kg (174 lb 11.2 oz)   03/22/24 84.8 kg (187 lb)   01/30/24 84.8 kg (187 lb)     BP Readings from Last 3 Encounters:   06/20/24 132/82   03/22/24 128/78   01/30/24 122/80       Health Maintenance   Topic Date Due    Pneumococcal Vaccine 0-64 (1 of 2 - PCV) Never done    DIABETIC EYE EXAM  Never done    ZOSTER VACCINE (1 of 2) Never done    HEPATITIS C SCREENING  Never done    ANNUAL PHYSICAL  Never done    DIABETIC FOOT EXAM  Never done    PAP SMEAR  Never done    RSV Vaccine - Adults (1 - 1-dose 60+ series) Never done    COVID-19 Vaccine (3 - 2023-24 season) 09/01/2023    COLORECTAL CANCER SCREENING  08/14/2024    INFLUENZA VACCINE   08/01/2024    HEMOGLOBIN A1C  10/02/2024    MAMMOGRAM  12/26/2024    DXA SCAN  12/26/2024    BMI FOLLOWUP  12/28/2024    LUNG CANCER SCREENING  01/12/2025    URINE MICROALBUMIN  04/02/2025    TDAP/TD VACCINES (2 - Td or Tdap) 12/28/2033       Physical Exam       Result Review :  The following data was reviewed by: LONNIE Huber on 06/20/2024:  No visits with results within 1 Month(s) from this visit.   Latest known visit with results is:   Clinical Support on 04/02/2024   Component Date Value    Glucose 04/02/2024 96     BUN 04/02/2024 19     Creatinine 04/02/2024 1.09 (H)     Sodium 04/02/2024 142     Potassium 04/02/2024 4.4     Chloride 04/02/2024 104     CO2 04/02/2024 27.1     Calcium 04/02/2024 9.4     Total Protein 04/02/2024 7.4     Albumin 04/02/2024 4.5     ALT (SGPT) 04/02/2024 19     AST (SGOT) 04/02/2024 15     Alkaline Phosphatase 04/02/2024 86     Total Bilirubin 04/02/2024 0.4     Globulin 04/02/2024 2.9     A/G Ratio 04/02/2024 1.6     BUN/Creatinine Ratio 04/02/2024 17.4     Anion Gap 04/02/2024 10.9     eGFR 04/02/2024 57.9 (L)     Hemoglobin A1C 04/02/2024 6.80 (H)     Total Cholesterol 04/02/2024 137     Triglycerides 04/02/2024 111     HDL Cholesterol 04/02/2024 56     LDL Cholesterol  04/02/2024 61     VLDL Cholesterol 04/02/2024 20     LDL/HDL Ratio 04/02/2024 1.05     Microalbumin, Urine 04/02/2024 4.4        Procedures        Assessment and Plan   Diagnoses and all orders for this visit:    1. Type 2 diabetes mellitus without complication, without long-term current use of insulin (Primary)  -     Comprehensive Metabolic Panel; Future  -     Hemoglobin A1c; Future    2. Left knee pain, unspecified chronicity    3. Primary hypertension  -     losartan (COZAAR) 50 MG tablet; Take 1 tablet by mouth Daily.  Dispense: 90 tablet; Refill: 1  -     CBC & Differential; Future  -     Lipid Panel; Future      Will increase dose of losartan to 50 mg for better control of blood pressure.   Patient to notify with continued high or low blood pressure readings or feeling lightheaded or dizzy.  Patient to follow-up in approximately 2 weeks for fasting labs.    FOLLOW UP  No follow-ups on file.    Patient was given instructions and counseling regarding her condition or for health maintenance advice. Please see specific information pulled into the AVS if appropriate.       [place] CORTÉSBridgett Rock, APRN  06/20/24  08:52 EDT    CURRENT & DISCONTINUED MEDICATIONS  Current Outpatient Medications   Medication Instructions    albuterol sulfate  (90 Base) MCG/ACT inhaler 2 puffs, Inhalation, Every 4 Hours PRN    aspirin 81 mg, Oral, Daily    B Complex Vitamins (VITAMIN B COMPLEX PO) 1 tablet, Oral, Daily    budesonide-formoterol (SYMBICORT) 160-4.5 MCG/ACT inhaler 2 puffs, Inhalation, 2 Times Daily    Calcium-Magnesium-Vitamin D 185- MG-MG-UNIT capsule 3 tablets, Oral, Daily    diclofenac (VOLTAREN) 50 mg, Oral, 3 Times Daily PRN    losartan (COZAAR) 50 mg, Oral, Daily    Misc Natural Products (OSTEO BI-FLEX ADV JOINT SHIELD PO) 1 tablet, Oral, Daily    nystatin 560766 UNIT/GM powder APPLY TOPICALLY TO THE AFFECTED AREA THREE TIMES DAILY FOR 7 DAYS    Potassium (POTASSIMIN PO) 1 tablet, Oral, Daily    rosuvastatin (CRESTOR) 10 mg, Oral, Daily       Medications Discontinued During This Encounter   Medication Reason    losartan (COZAAR) 25 MG tablet Reorder           0

## 2024-08-27 NOTE — ED CDU PROVIDER INITIAL DAY NOTE - CHIEF COMPLAINT
.Weekly Wound Education Note    Teaching Provided To: Family;Patient  Training Topics: Dressing;Edema control;Cleasing and general instructions;Compression;Discharge instructions  Training Method: Explain/Verbal;Written  Training Response: Patient responds and understands        Notes: Wounds improving. Continue open xeroform, ABD pad, and calamine unna boot 20-30mmHg wrapped loose.       The patient is a 85y Female complaining of code: stroke.

## 2024-10-02 NOTE — ED PROVIDER NOTE - ATTESTATION, MLM
DepoProvera Injection.   I have reviewed and confirmed nurses' notes for patient's medications, allergies, medical history, and surgical history.

## 2024-12-06 NOTE — PATIENT PROFILE ADULT - NSPROGENPREVTRANSF_GEN_A_NUR
How Severe Are Your Bumps?: mild
Have Your Bumps Been Treated?: not been treated
Is This A New Presentation, Or A Follow-Up?: Bumps
no history of blood product transfusion